# Patient Record
Sex: FEMALE | Race: WHITE | NOT HISPANIC OR LATINO | Employment: OTHER | URBAN - METROPOLITAN AREA
[De-identification: names, ages, dates, MRNs, and addresses within clinical notes are randomized per-mention and may not be internally consistent; named-entity substitution may affect disease eponyms.]

---

## 2018-06-25 DIAGNOSIS — I10 ESSENTIAL HYPERTENSION: Primary | ICD-10-CM

## 2018-06-25 RX ORDER — PROPRANOLOL HYDROCHLORIDE 20 MG/1
20 TABLET ORAL
Qty: 180 TABLET | Refills: 3 | Status: SHIPPED | OUTPATIENT
Start: 2018-06-25 | End: 2018-06-29 | Stop reason: SDUPTHER

## 2018-06-29 DIAGNOSIS — I10 ESSENTIAL HYPERTENSION: ICD-10-CM

## 2018-06-29 RX ORDER — PROPRANOLOL HYDROCHLORIDE 20 MG/1
20 TABLET ORAL
Qty: 180 TABLET | Refills: 0 | Status: SHIPPED | OUTPATIENT
Start: 2018-06-29 | End: 2018-12-18 | Stop reason: SDUPTHER

## 2018-08-23 ENCOUNTER — OFFICE VISIT (OUTPATIENT)
Dept: FAMILY MEDICINE CLINIC | Facility: CLINIC | Age: 63
End: 2018-08-23
Payer: MEDICARE

## 2018-08-23 VITALS
BODY MASS INDEX: 37.51 KG/M2 | OXYGEN SATURATION: 97 % | RESPIRATION RATE: 20 BRPM | TEMPERATURE: 96.4 F | WEIGHT: 233.4 LBS | DIASTOLIC BLOOD PRESSURE: 70 MMHG | HEIGHT: 66 IN | SYSTOLIC BLOOD PRESSURE: 130 MMHG | HEART RATE: 64 BPM

## 2018-08-23 DIAGNOSIS — R73.09 ABNORMAL BLOOD SUGAR: ICD-10-CM

## 2018-08-23 DIAGNOSIS — J20.9 ACUTE BRONCHITIS, UNSPECIFIED ORGANISM: Primary | ICD-10-CM

## 2018-08-23 DIAGNOSIS — R79.89 LOW VITAMIN D LEVEL: ICD-10-CM

## 2018-08-23 DIAGNOSIS — R93.89 ABNORMAL CT SCAN, CHEST: ICD-10-CM

## 2018-08-23 PROBLEM — I51.7 LEFT VENTRICULAR HYPERTROPHY: Status: ACTIVE | Noted: 2018-08-23

## 2018-08-23 PROBLEM — M85.80 OSTEOPENIA: Status: ACTIVE | Noted: 2018-08-23

## 2018-08-23 PROBLEM — R91.8 ABNORMAL CT SCAN, LUNG: Status: ACTIVE | Noted: 2017-10-18

## 2018-08-23 PROBLEM — I34.1 MITRAL VALVE PROLAPSE: Status: ACTIVE | Noted: 2018-08-23

## 2018-08-23 PROBLEM — E78.5 HYPERLIPIDEMIA: Status: ACTIVE | Noted: 2018-08-23

## 2018-08-23 PROBLEM — F41.9 ANXIETY: Status: ACTIVE | Noted: 2018-08-23

## 2018-08-23 PROCEDURE — 99214 OFFICE O/P EST MOD 30 MIN: CPT | Performed by: FAMILY MEDICINE

## 2018-08-23 RX ORDER — AMOXICILLIN AND CLAVULANATE POTASSIUM 500; 125 MG/1; MG/1
1 TABLET, FILM COATED ORAL EVERY 8 HOURS SCHEDULED
Qty: 30 TABLET | Refills: 0 | Status: SHIPPED | OUTPATIENT
Start: 2018-08-23 | End: 2018-09-02

## 2018-08-23 RX ORDER — ACETAMINOPHEN 500 MG
TABLET ORAL
COMMUNITY

## 2018-08-23 NOTE — PROGRESS NOTES
Assessment/Plan:      Diagnoses and all orders for this visit:    Acute bronchitis, unspecified organism  Comments:  call if not bettr or worse  Orders:  -     amoxicillin-clavulanate (AUGMENTIN) 500-125 mg per tablet; Take 1 tablet by mouth every 8 (eight) hours for 10 days    Smoking (tobacco) complicating pregnancy, second trimester  Comments:  advise to stop smoking   Abnormal blood sugar  Comments:  advise to lose wt     Abnormal CT scan, chest  -     CT chest wo contrast; Future    Low vitamin D level  -     Vitamin D 25 hydroxy; Future    Other orders  -     acetaminophen (TYLENOL) 500 mg tablet; 2 tablet po 3 times q day  -     Multiple Vitamins-Minerals (PRESERVISION AREDS 2 PO); Take 1 capsule by mouth daily          Subjective:     Patient ID: Daksha Desai is a 66 y o  female  Cough , x one week , productive , with yellow phlegm, moderate , off and one , getting worse , pt smokes   denied wheezing , chils , pt feels feverish   Chronic medical problems  Abnormal chest Ct scan , pt is not following with pulmonary , pt before she got sick , denied cough , pt denied hemoptysis , wt loss  Low vit D , pt denied fatigue , hair loss   Abnormal BS , pt denied polyuria or polydipsia     no test done since last office visii   patient had a slip from her psychiatrist to check CMP, CBC, TSH and lipid        Review of Systems   Constitutional: Negative for chills, diaphoresis and fatigue  HENT: Negative for ear pain, sore throat, trouble swallowing and voice change  Eyes: Negative for visual disturbance  Respiratory: Negative for chest tightness and shortness of breath  Cardiovascular: Negative for chest pain, palpitations and leg swelling  Gastrointestinal: Negative for abdominal pain, blood in stool, constipation, diarrhea and nausea  Endocrine: Negative for polydipsia and polyuria     Genitourinary: Negative for dysuria, flank pain, frequency, hematuria, pelvic pain, urgency, vaginal bleeding and vaginal discharge  Musculoskeletal: Negative for arthralgias, back pain, gait problem, myalgias and neck pain  Neurological: Negative for dizziness, tremors, seizures, weakness, light-headedness, numbness and headaches  Hematological: Negative for adenopathy  Does not bruise/bleed easily  Psychiatric/Behavioral: Negative for confusion  Objective:     Physical Exam   Constitutional: She is oriented to person, place, and time  She appears well-developed  No distress  HENT:   Head: Normocephalic and atraumatic  Right Ear: External ear normal    Left Ear: External ear normal    Nose: Nose normal    Mouth/Throat: Oropharynx is clear and moist  No oropharyngeal exudate  Eyes: Conjunctivae are normal  Pupils are equal, round, and reactive to light  No scleral icterus  Neck: Normal range of motion  Neck supple  No JVD present  No thyromegaly present  Cardiovascular: Normal rate, regular rhythm, normal heart sounds and intact distal pulses  No murmur heard  Pulmonary/Chest: Effort normal and breath sounds normal    Abdominal: Soft  Bowel sounds are normal  She exhibits no distension and no mass  There is no tenderness  There is no rebound and no guarding  Musculoskeletal: She exhibits no edema, tenderness or deformity  Lymphadenopathy:     She has no cervical adenopathy  Neurological: She is alert and oriented to person, place, and time  She has normal reflexes  No cranial nerve deficit  Coordination normal    Skin: No rash noted  She is not diaphoretic  No erythema  No pallor  Psychiatric: She has a normal mood and affect   Her behavior is normal  Judgment and thought content normal

## 2018-08-24 PROBLEM — R79.89 LOW VITAMIN D LEVEL: Status: ACTIVE | Noted: 2018-08-24

## 2018-09-01 ENCOUNTER — HOSPITAL ENCOUNTER (OUTPATIENT)
Dept: CT IMAGING | Facility: HOSPITAL | Age: 63
Discharge: HOME | End: 2018-09-01
Payer: MEDICARE

## 2018-09-01 DIAGNOSIS — R93.89 ABNORMAL CT SCAN, CHEST: ICD-10-CM

## 2018-09-01 PROCEDURE — 71250 CT THORAX DX C-: CPT

## 2018-10-01 ENCOUNTER — OFFICE VISIT (OUTPATIENT)
Dept: FAMILY MEDICINE CLINIC | Facility: CLINIC | Age: 63
End: 2018-10-01
Payer: MEDICARE

## 2018-10-01 VITALS
HEART RATE: 71 BPM | BODY MASS INDEX: 37.62 KG/M2 | OXYGEN SATURATION: 97 % | SYSTOLIC BLOOD PRESSURE: 130 MMHG | RESPIRATION RATE: 20 BRPM | TEMPERATURE: 97.4 F | DIASTOLIC BLOOD PRESSURE: 80 MMHG | WEIGHT: 232.4 LBS

## 2018-10-01 DIAGNOSIS — L98.9 SKIN LESION OF RIGHT ARM: ICD-10-CM

## 2018-10-01 DIAGNOSIS — R93.89 ABNORMAL CT SCAN, CHEST: Primary | ICD-10-CM

## 2018-10-01 DIAGNOSIS — K76.0 FATTY LIVER: ICD-10-CM

## 2018-10-01 DIAGNOSIS — N39.41 URGE INCONTINENCE OF URINE: ICD-10-CM

## 2018-10-01 DIAGNOSIS — R16.2 HEPATOSPLENOMEGALY: ICD-10-CM

## 2018-10-01 DIAGNOSIS — L98.9 SKIN LESION OF LEFT ARM: ICD-10-CM

## 2018-10-01 DIAGNOSIS — Z23 NEED FOR IMMUNIZATION AGAINST INFLUENZA: ICD-10-CM

## 2018-10-01 PROCEDURE — 99214 OFFICE O/P EST MOD 30 MIN: CPT | Performed by: FAMILY MEDICINE

## 2018-10-01 PROCEDURE — 90682 RIV4 VACC RECOMBINANT DNA IM: CPT

## 2018-10-01 PROCEDURE — 90471 IMMUNIZATION ADMIN: CPT

## 2018-10-01 NOTE — PROGRESS NOTES
Assessment/Plan:      Diagnoses and all orders for this visit:    Abnormal CT scan, chest  Comments:  abnormality resolved  advise to stop smoking  Hepatosplenomegaly  -     Alpha-1-antitrypsin; Future  -     CHENTE Screen w/ Reflex to Titer/Pattern; Future  -     Antimitochondrial antibody; Future  -     Anti-Smooth Muscle/Mitochond; Future  -     Ceruloplasmin; Future  -     Hepatitis A Ab, Total W/Refl IgM; Future  -     Hepatitis B surface antigen; Future  -     Hepatitis C antibody; Future  -     Iron Saturation %; Future  -     Iron, TIBC and Ferritin Panel; Future  -     Protime-INR; Future    Fatty liver  Comments:  advise to lose wt  Orders:  -     Hepatitis A Ab, Total W/Refl IgM; Future  -     Hepatitis B surface antigen; Future  -     Hepatitis C antibody; Future  -     Iron Saturation %; Future  -     Iron, TIBC and Ferritin Panel; Future  -     Protime-INR; Future    Urge incontinence of urine  -     Urine culture; Future  -     Urinalysis with reflex to microscopic    Skin lesion of left arm  -     Ambulatory referral to Plastic Surgery; Future    Skin lesion of right arm  -     Ambulatory referral to Plastic Surgery; Future    Need for immunization against influenza  -     influenza vaccine, [id number], quadrivalent, recombinant, PF, 0 5 mL, for patients 18 yr+ (FLUBLOK)          Subjective:     Patient ID: Georgiana Cobb is a 66 y o  female  Patient is here for follow-up  Abnormal CT scan of the chest   Denied chest pain, shortness of breath, cough or hemoptysis  Hepatomegaly  Denied abdominal pain  Fatty liver  Patient denied drinking alcohol, saw GI ,had liver biopsy in the past   New complaints  Skin lesion  Patient admit to have a skin lesion on left forearm and right forearm  for 3 months  It is getting larger  Home denied any drainage or change in color  Admitted to urgent continence for 1 year  denied urinary frequency, dysuria or hematuria  Depression   Doing well ,following with psychiatry   Has slip for blood work including CBC,CMP   TSH, LIPID    Test result  Chest CT scan discussed result with patient  Labs not done        Review of Systems   Constitutional: Negative for activity change, appetite change, chills, fatigue, fever and unexpected weight change  HENT: Negative for congestion, ear pain, sinus pressure and sore throat  Eyes: Negative for visual disturbance  Respiratory: Negative for cough, chest tightness, shortness of breath and wheezing  Cardiovascular: Negative for chest pain, palpitations and leg swelling  Gastrointestinal: Negative for abdominal pain, blood in stool, constipation, diarrhea, nausea and vomiting  Genitourinary: Negative for dysuria, frequency, hematuria and urgency  Musculoskeletal: Negative for arthralgias, back pain, gait problem, joint swelling, myalgias and neck pain  Skin: Negative for rash  Neurological: Negative for dizziness, tremors, seizures, syncope, weakness, light-headedness and headaches  Hematological: Negative for adenopathy  Does not bruise/bleed easily  Psychiatric/Behavioral: Negative for behavioral problems, confusion, dysphoric mood and sleep disturbance  Objective:     Physical Exam   Constitutional: She is oriented to person, place, and time  She appears well-developed and well-nourished  HENT:   Head: Normocephalic  Eyes: No scleral icterus  Neck: Neck supple  No JVD present  No thyromegaly present  Cardiovascular: Normal rate and regular rhythm  No murmur heard  Pulmonary/Chest: Effort normal and breath sounds normal    Abdominal: Soft  Bowel sounds are normal  She exhibits no distension and no mass  There is no tenderness  There is no rebound and no guarding  Musculoskeletal: She exhibits no edema or tenderness  Lymphadenopathy:     She has no cervical adenopathy  Neurological: She is alert and oriented to person, place, and time  No cranial nerve deficit   She exhibits normal muscle tone    Skin: No rash noted  No erythema  No pallor  5 x 4 mm pink inch elevated lesion located at the left forearm  9 x 6 mm lesion at the right forearm the base is fixed to the skin and the rest of the lesion is movable , it is slightly thick light brownish lesion   Psychiatric: She has a normal mood and affect   Her behavior is normal  Judgment and thought content normal

## 2018-10-31 ENCOUNTER — TRANSCRIBE ORDERS (OUTPATIENT)
Dept: ADMINISTRATIVE | Facility: HOSPITAL | Age: 63
End: 2018-10-31

## 2018-10-31 ENCOUNTER — APPOINTMENT (OUTPATIENT)
Dept: LAB | Facility: HOSPITAL | Age: 63
End: 2018-10-31
Payer: MEDICARE

## 2018-10-31 DIAGNOSIS — R16.2 HEPATOSPLENOMEGALY: ICD-10-CM

## 2018-10-31 DIAGNOSIS — K76.0 FATTY LIVER: ICD-10-CM

## 2018-10-31 DIAGNOSIS — N39.41 URGE INCONTINENCE OF URINE: ICD-10-CM

## 2018-10-31 DIAGNOSIS — F31.9 BIPOLAR 1 DISORDER (HCC): Primary | ICD-10-CM

## 2018-10-31 DIAGNOSIS — F31.9 BIPOLAR 1 DISORDER (HCC): ICD-10-CM

## 2018-10-31 DIAGNOSIS — R79.89 LOW VITAMIN D LEVEL: ICD-10-CM

## 2018-10-31 LAB
25(OH)D3 SERPL-MCNC: 11.4 NG/ML (ref 30–100)
ALBUMIN SERPL BCP-MCNC: 3.8 G/DL (ref 3.5–5)
ALP SERPL-CCNC: 86 U/L (ref 46–116)
ALT SERPL W P-5'-P-CCNC: 50 U/L (ref 12–78)
ANION GAP SERPL CALCULATED.3IONS-SCNC: 8 MMOL/L (ref 4–13)
AST SERPL W P-5'-P-CCNC: 39 U/L (ref 5–45)
BACTERIA UR QL AUTO: ABNORMAL /HPF
BASOPHILS # BLD AUTO: 0.02 THOUSANDS/ΜL (ref 0–0.1)
BASOPHILS NFR BLD AUTO: 0 % (ref 0–1)
BILIRUB SERPL-MCNC: 0.24 MG/DL (ref 0.2–1)
BILIRUB UR QL STRIP: NEGATIVE
BUN SERPL-MCNC: 7 MG/DL (ref 5–25)
CALCIUM SERPL-MCNC: 8.8 MG/DL (ref 8.3–10.1)
CHLORIDE SERPL-SCNC: 104 MMOL/L (ref 100–108)
CHOLEST SERPL-MCNC: 192 MG/DL (ref 50–200)
CLARITY UR: CLEAR
CO2 SERPL-SCNC: 29 MMOL/L (ref 21–32)
COLOR UR: ABNORMAL
CREAT SERPL-MCNC: 0.74 MG/DL (ref 0.6–1.3)
EOSINOPHIL # BLD AUTO: 0 THOUSAND/ΜL (ref 0–0.61)
EOSINOPHIL NFR BLD AUTO: 0 % (ref 0–6)
ERYTHROCYTE [DISTWIDTH] IN BLOOD BY AUTOMATED COUNT: 12.8 % (ref 11.6–15.1)
FERRITIN SERPL-MCNC: 92 NG/ML (ref 8–388)
GFR SERPL CREATININE-BSD FRML MDRD: 88 ML/MIN/1.73SQ M
GLUCOSE P FAST SERPL-MCNC: 104 MG/DL (ref 65–99)
GLUCOSE UR STRIP-MCNC: NEGATIVE MG/DL
HCT VFR BLD AUTO: 41.6 % (ref 34.8–46.1)
HDLC SERPL-MCNC: 29 MG/DL (ref 40–60)
HGB BLD-MCNC: 13.3 G/DL (ref 11.5–15.4)
HGB UR QL STRIP.AUTO: NEGATIVE
IMM GRANULOCYTES # BLD AUTO: 0.01 THOUSAND/UL (ref 0–0.2)
IMM GRANULOCYTES NFR BLD AUTO: 0 % (ref 0–2)
INR PPP: 1.07 (ref 0.86–1.17)
IRON SATN MFR SERPL: 17 %
IRON SERPL-MCNC: 53 UG/DL (ref 50–170)
KETONES UR STRIP-MCNC: NEGATIVE MG/DL
LDLC SERPL CALC-MCNC: 126 MG/DL (ref 0–100)
LEUKOCYTE ESTERASE UR QL STRIP: ABNORMAL
LYMPHOCYTES # BLD AUTO: 2.06 THOUSANDS/ΜL (ref 0.6–4.47)
LYMPHOCYTES NFR BLD AUTO: 40 % (ref 14–44)
MCH RBC QN AUTO: 28.5 PG (ref 26.8–34.3)
MCHC RBC AUTO-ENTMCNC: 32 G/DL (ref 31.4–37.4)
MCV RBC AUTO: 89 FL (ref 82–98)
MONOCYTES # BLD AUTO: 0.31 THOUSAND/ΜL (ref 0.17–1.22)
MONOCYTES NFR BLD AUTO: 6 % (ref 4–12)
NEUTROPHILS # BLD AUTO: 2.75 THOUSANDS/ΜL (ref 1.85–7.62)
NEUTS SEG NFR BLD AUTO: 54 % (ref 43–75)
NITRITE UR QL STRIP: NEGATIVE
NON-SQ EPI CELLS URNS QL MICRO: ABNORMAL /HPF
NONHDLC SERPL-MCNC: 163 MG/DL
NRBC BLD AUTO-RTO: 0 /100 WBCS
PH UR STRIP.AUTO: 5.5 [PH] (ref 4.5–8)
PLATELET # BLD AUTO: 188 THOUSANDS/UL (ref 149–390)
PMV BLD AUTO: 10 FL (ref 8.9–12.7)
POTASSIUM SERPL-SCNC: 3.9 MMOL/L (ref 3.5–5.3)
PROT SERPL-MCNC: 7.6 G/DL (ref 6.4–8.2)
PROT UR STRIP-MCNC: NEGATIVE MG/DL
PROTHROMBIN TIME: 14 SECONDS (ref 11.8–14.2)
RBC # BLD AUTO: 4.66 MILLION/UL (ref 3.81–5.12)
RBC #/AREA URNS AUTO: ABNORMAL /HPF
SODIUM SERPL-SCNC: 141 MMOL/L (ref 136–145)
SP GR UR STRIP.AUTO: 1.01 (ref 1–1.03)
TIBC SERPL-MCNC: 318 UG/DL (ref 250–450)
TRIGL SERPL-MCNC: 185 MG/DL
TSH SERPL DL<=0.05 MIU/L-ACNC: 2.09 UIU/ML (ref 0.36–3.74)
UROBILINOGEN UR QL STRIP.AUTO: 0.2 E.U./DL
WBC # BLD AUTO: 5.15 THOUSAND/UL (ref 4.31–10.16)
WBC #/AREA URNS AUTO: ABNORMAL /HPF

## 2018-10-31 PROCEDURE — 86708 HEPATITIS A ANTIBODY: CPT

## 2018-10-31 PROCEDURE — 87086 URINE CULTURE/COLONY COUNT: CPT

## 2018-10-31 PROCEDURE — 80053 COMPREHEN METABOLIC PANEL: CPT

## 2018-10-31 PROCEDURE — 84443 ASSAY THYROID STIM HORMONE: CPT

## 2018-10-31 PROCEDURE — 82728 ASSAY OF FERRITIN: CPT

## 2018-10-31 PROCEDURE — 80061 LIPID PANEL: CPT

## 2018-10-31 PROCEDURE — 86038 ANTINUCLEAR ANTIBODIES: CPT

## 2018-10-31 PROCEDURE — 86256 FLUORESCENT ANTIBODY TITER: CPT

## 2018-10-31 PROCEDURE — 85025 COMPLETE CBC W/AUTO DIFF WBC: CPT

## 2018-10-31 PROCEDURE — 85610 PROTHROMBIN TIME: CPT

## 2018-10-31 PROCEDURE — 36415 COLL VENOUS BLD VENIPUNCTURE: CPT

## 2018-10-31 PROCEDURE — 83540 ASSAY OF IRON: CPT

## 2018-10-31 PROCEDURE — 83550 IRON BINDING TEST: CPT

## 2018-10-31 PROCEDURE — 86803 HEPATITIS C AB TEST: CPT

## 2018-10-31 PROCEDURE — 81001 URINALYSIS AUTO W/SCOPE: CPT | Performed by: FAMILY MEDICINE

## 2018-10-31 PROCEDURE — 86235 NUCLEAR ANTIGEN ANTIBODY: CPT

## 2018-10-31 PROCEDURE — 82103 ALPHA-1-ANTITRYPSIN TOTAL: CPT

## 2018-10-31 PROCEDURE — 87147 CULTURE TYPE IMMUNOLOGIC: CPT

## 2018-10-31 PROCEDURE — 87340 HEPATITIS B SURFACE AG IA: CPT

## 2018-10-31 PROCEDURE — 82306 VITAMIN D 25 HYDROXY: CPT

## 2018-10-31 PROCEDURE — 82390 ASSAY OF CERULOPLASMIN: CPT

## 2018-11-01 LAB
A1AT SERPL-MCNC: 136 MG/DL (ref 90–200)
ACTIN IGG SERPL-ACNC: 4 UNITS (ref 0–19)
CERULOPLASMIN SERPL-MCNC: 27.8 MG/DL (ref 19–39)
HAV AB SER QL IA: NORMAL
HBV SURFACE AG SER QL: NORMAL
HCV AB SER QL: NORMAL
MITOCHONDRIA M2 IGG SER-ACNC: 2 UNITS (ref 0–20)

## 2018-11-02 LAB
BACTERIA UR CULT: ABNORMAL
BACTERIA UR CULT: ABNORMAL

## 2018-11-03 LAB — RYE IGE QN: NEGATIVE

## 2018-11-05 DIAGNOSIS — E55.9 VITAMIN D DEFICIENCY: Primary | ICD-10-CM

## 2018-11-05 DIAGNOSIS — N39.0 URINARY TRACT INFECTION WITHOUT HEMATURIA, SITE UNSPECIFIED: ICD-10-CM

## 2018-11-05 RX ORDER — AMOXICILLIN 500 MG/1
500 CAPSULE ORAL EVERY 8 HOURS SCHEDULED
Qty: 21 CAPSULE | Refills: 0 | Status: SHIPPED | OUTPATIENT
Start: 2018-11-05 | End: 2018-11-12

## 2018-11-05 RX ORDER — ERGOCALCIFEROL 1.25 MG/1
50000 CAPSULE ORAL WEEKLY
Qty: 12 CAPSULE | Refills: 0 | Status: SHIPPED | OUTPATIENT
Start: 2018-11-05 | End: 2019-05-29 | Stop reason: SDUPTHER

## 2018-12-10 PROCEDURE — 88305 TISSUE EXAM BY PATHOLOGIST: CPT | Performed by: PATHOLOGY

## 2018-12-11 ENCOUNTER — LAB REQUISITION (OUTPATIENT)
Dept: LAB | Facility: HOSPITAL | Age: 63
End: 2018-12-11
Payer: MEDICARE

## 2018-12-11 DIAGNOSIS — D23.71 OTHER BENIGN NEOPLASM OF SKIN OF RIGHT LOWER LIMB, INCLUDING HIP: ICD-10-CM

## 2018-12-11 DIAGNOSIS — D23.61 OTHER BENIGN NEOPLASM OF SKIN OF RIGHT UPPER LIMB, INCLUDING SHOULDER: ICD-10-CM

## 2018-12-17 RX ORDER — OFLOXACIN 3 MG/ML
SOLUTION AURICULAR (OTIC)
Refills: 1 | COMMUNITY
Start: 2018-10-11 | End: 2018-12-18 | Stop reason: ALTCHOICE

## 2018-12-18 ENCOUNTER — OFFICE VISIT (OUTPATIENT)
Dept: CARDIOLOGY CLINIC | Facility: CLINIC | Age: 63
End: 2018-12-18
Payer: MEDICARE

## 2018-12-18 VITALS
BODY MASS INDEX: 38.95 KG/M2 | WEIGHT: 233.8 LBS | SYSTOLIC BLOOD PRESSURE: 122 MMHG | OXYGEN SATURATION: 97 % | HEART RATE: 61 BPM | DIASTOLIC BLOOD PRESSURE: 60 MMHG | HEIGHT: 65 IN

## 2018-12-18 DIAGNOSIS — I10 HYPERTENSION, UNSPECIFIED TYPE: Primary | ICD-10-CM

## 2018-12-18 DIAGNOSIS — I34.1 MITRAL VALVE PROLAPSE: ICD-10-CM

## 2018-12-18 DIAGNOSIS — I10 ESSENTIAL HYPERTENSION: ICD-10-CM

## 2018-12-18 PROCEDURE — 99214 OFFICE O/P EST MOD 30 MIN: CPT | Performed by: INTERNAL MEDICINE

## 2018-12-18 PROCEDURE — 93000 ELECTROCARDIOGRAM COMPLETE: CPT | Performed by: INTERNAL MEDICINE

## 2018-12-18 RX ORDER — PROPRANOLOL HYDROCHLORIDE 20 MG/1
20 TABLET ORAL
Qty: 180 TABLET | Refills: 3 | Status: SHIPPED | OUTPATIENT
Start: 2018-12-18 | End: 2018-12-22 | Stop reason: SDUPTHER

## 2018-12-18 NOTE — ASSESSMENT & PLAN NOTE
History of mitral valve prolapse and left ventricular hypertrophy in the past   From cardiac perspective symptoms have been stable  She has had no significant palpitations or other concerning symptoms  Am glad that she has quit smoking  And she feels better  - Patient is advised to continue current medical therapy  It is my opinion that increasing propranolol does as she wants is not a good idea as it may make her bradycardic and give her more slowing when she is going through  Emotional stress  -  We will arrange for an echocardiogram to reassess her left ventricular hypertrophy and mitral valve prolapse  - Dietary and medical compliance are reinforced  - Advised  to report any concerning symptoms such as chest pain, shortness of breath, decline in exercise tolerance or presyncope/syncope

## 2018-12-18 NOTE — PROGRESS NOTES
[facility]    [address], [address]   [address]  [address]  Phone#  [phone]  Fax#  [phone]  *-*-*-*-*-*-*-*-*-*-*-*-*-*-*-*-*-*-*-*-*-*-*-*-*-*-*-*-*-*-*-*-*-*-*-*-*-*-*-*-*-*-*-*-*-*-*-*-*-*-*-*-*-*    Layla Oh DATE: 12/18/18 3:16 PM    PATIENT NAME: Maria G Trejo   1955    [id number]  Age: 66 y o  Sex: female    AUTHOR: Nathalia Delcid MD  PRIMARYCARE PHYSICIAN: Levar Hagan MD    DIAGNOSES:  1  History of mitral valve prolapse by echocardiogram in the past  2  Benign essential hypertension  3  Hypertensive heart disease  4  History of depression and mood disorder  5  Osteopenia  6  History of tibial and fibular fracture in 2004  7  History of hysterectomy in the past  8  Degenerative joint disease status post fusion surgery of the lumbar spine  9  Irritable bowel syndrome  10  History of splenomegaly  11  Asymptomatic bradycardia  12  Mixed dyslipidemia     Holter monitor in August 2016 showed predominant sinus rhythm with average heart rate of 76 beats per minute with no significant arrhythmia  Patient had single episode of palpitation during the monitoring, rhythm was normal sinus  No recent echocardiogram    CURRENT ECG:  Results for orders placed or performed in visit on 12/18/18   POCT ECG    Narrative    Sinus rhythm at 61 beats per minute, normal axis and intervals, no significant ST T wave abnormalities  CARDIOLOGY ASSESSMENT & PLAN:  No problem-specific Assessment & Plan notes found for this encounter  INTERVAL HISTORY & HISTORY OF PRESENT ILLNESS:  Maria G Trejo is here for follow-up regarding her cardiac comorbidities which include:   History of mitral valve prolapse in the past, palpitation and sinus arrhythmia  She is here for follow-up  She has been dealing with significant mental and emotional stress due to both her parents passing away last year and in dealing with family and child who has ADHD    She follows with psychiatrist Dr Jennifer Triana but does not see a counselor on a regular basis  She is wondering if we should increase the dose of propranolol because of her increasing anxiety in the recent past       She has recently quit smoking with the aid of nicotine patch which is now down to 7 mg patch  This has affected her sleep and she has significant insomnia  Otherwise she denies any subjective cardiac complaints  There have been no recent active symptoms of chest discomfort or exertional angina  There is no dyspnea with usual activities or exertion  No orthopnea, PND or pedal edema  No palpitations, lightheadedness, presyncope, or syncope  Denies any recent hospitalizations or other illnesses  Reports being compliant with medications  REVIEW OF SYMPTOMS:    Positive for:    Mood disturbance, some depression and some fatigue  Insomnia  Negative for: All remaining as reviewed below and in HPI  SYSTEM SYMPTOMS REVIEWED:  General--weight change, fever, night sweats  Respirato      Cardiovascular--chest pain, syncope, dyspnea on exertion, edema, decline in exercise tolerance, claudication   Gastrointestinal--persistent vomiting, diarrhea, abdominal distention, blood in stool   Muscular or skeletal--joint pain or swelling   Neurologic--headaches, syncope, abnormal movement  Hematologic--history of easy bruising and bleeding   Endocrine--thyroid enlargement, heat or cold intolerance, polyuria   Psychiatric--anxiety, depression     *-*-*-*-*-*-*-*-*-*-*-*-*-*-*-*-*-*-*-*-*-*-*-*-*-*-*-*-*-*-*-*-*-*-*-*-*-*-*-*-*-*-*-*-*-*-*-*-*-*-*-*-*-*-  VITAL SIGNS:  Vitals:    12/18/18 1414   BP: 122/60   BP Location: Left arm   Patient Position: Sitting   Cuff Size: Large   Pulse: 61   SpO2: 97%   Weight: 106 kg (233 lb 12 8 oz)   Height: 5' 5" (1 651 m)       *-*-*-*-*-*-*-*-*-*-*-*-*-*-*-*-*-*-*-*-*-*-*-*-*-*-*-*-*-*-*-*-*-*-*-*-*-*-*-*-*-*-*-*-*-*-*-*-*-*-*-*-*-*-  PHYSICAL EXAM:  General Appearance:    Alert, cooperative, no distress, appears stated age, large built, obese   Head, Eyes, ENT:    No obvious abnormality, moist mucous mebranes  Neck:   Supple, no carotid bruit or JVD   Back:     Symmetric, no curvature  Lungs:     Respirations unlabored  Clear to auscultation bilaterally,    Chest wall:    No tenderness or deformity   Heart:     Slightly bradycardic, regular rate and rhythm, S1 and S2 normal, no murmur, rub  or gallop  Abdomen:     Soft, non-tender, No obvious masses, or organomegaly   Extremities:   Extremities normal, no cyanosis or edema    Skin:   Skin color, texture, turgor normal, no rashes or lesions     *-*-*-*-*-*-*-*-*-*-*-*-*-*-*-*-*-*-*-*-*-*-*-*-*-*-*-*-*-*-*-*-*-*-*-*-*-*-*-*-*-*-*-*-*-*-*-*-*-*-*-*-*-*-  CURRENT MEDICATION LIST:    Current Outpatient Prescriptions:     acetaminophen (TYLENOL) 500 mg tablet, 2 tablet po 3 times q day, Disp: , Rfl:     clonazePAM (KlonoPIN) 0 5 MG disintegrating tablet, Take 0 5 mg by mouth 2 (two) times a day, Disp: , Rfl:     desvenlafaxine (PRISTIQ) 100 mg 24 hr tablet, Take 100 mg by mouth daily  , Disp: , Rfl:     ergocalciferol (VITAMIN D2) 50,000 units, Take 1 capsule (50,000 Units total) by mouth once a week, Disp: 12 capsule, Rfl: 0    lurasidone (LATUDA) 20 mg tablet, Take 20 mg by mouth daily  , Disp: , Rfl:     Multiple Vitamins-Minerals (PRESERVISION AREDS 2 PO), Take 1 capsule by mouth daily, Disp: , Rfl:     Omega-3 Fatty Acids (FISH OIL PO), Take 1 capsule by mouth daily  , Disp: , Rfl:     OXcarbazepine (TRILEPTAL) 300 mg tablet, Take 300 mg by mouth 2 (two) times a day  , Disp: , Rfl:     propranolol (INDERAL) 20 mg tablet, Take 1 tablet (20 mg total) by mouth 2 (two) times a day for 90 days, Disp: 180 tablet, Rfl: 0    ALLERGIES:  Allergies   Allergen Reactions    Bupropion     Corticosteroids     Erythromycin     Escitalopram     Tetracycline      Other reaction(s):  Other (See Comments)  Mouth sores  Other Palpitations     "Steroids"        *-*-*-*-*-*-*-*-*-*-*-*-*-*-*-*-*-*-*-*-*-*-*-*-*-*-*-*-*-*-*-*-*-*-*-*-*-*-*-*-*-*-*-*-*-*-*-*-*-*-*-*-*-*-    LABORATORY DATA:  I have personally reviewed pertinent labs  Potassium   Date Value Ref Range Status   10/31/2018 3 9 3 5 - 5 3 mmol/L Final     Chloride   Date Value Ref Range Status   10/31/2018 104 100 - 108 mmol/L Final     CO2   Date Value Ref Range Status   10/31/2018 29 21 - 32 mmol/L Final     BUN   Date Value Ref Range Status   10/31/2018 7 5 - 25 mg/dL Final     Creatinine   Date Value Ref Range Status   10/31/2018 0 74 0 60 - 1 30 mg/dL Final     Comment:     Standardized to IDMS reference method     eGFR   Date Value Ref Range Status   10/31/2018 88 ml/min/1 73sq m Final     Calcium   Date Value Ref Range Status   10/31/2018 8 8 8 3 - 10 1 mg/dL Final     AST   Date Value Ref Range Status   10/31/2018 39 5 - 45 U/L Final     Comment:       Specimen collection should occur prior to Sulfasalazine administration due to the potential for falsely depressed results  ALT   Date Value Ref Range Status   10/31/2018 50 12 - 78 U/L Final     Comment:       Specimen collection should occur prior to Sulfasalazine administration due to the potential for falsely depressed results        Alkaline Phosphatase   Date Value Ref Range Status   10/31/2018 86 46 - 116 U/L Final     WBC   Date Value Ref Range Status   10/31/2018 5 15 4 31 - 10 16 Thousand/uL Final     Hemoglobin   Date Value Ref Range Status   10/31/2018 13 3 11 5 - 15 4 g/dL Final     Platelets   Date Value Ref Range Status   10/31/2018 188 149 - 390 Thousands/uL Final     INR   Date Value Ref Range Status   10/31/2018 1 07 0 86 - 1 17 Final     No results found for: CKMB, BNP, DIGOXIN  No results found for: TSH  HDL, Direct   Date Value Ref Range Status   10/31/2018 29 (L) 40 - 60 mg/dL Final     Comment:       HDL Cholesterol:       High    >60 mg/dL       Low     <41 mg/dL  Specimen collection should occur prior to Metamizole administration due to the potential for falsley depressed results  Triglycerides   Date Value Ref Range Status   10/31/2018 185 (H) <=150 mg/dL Final     Comment:       Triglyceride:     Normal          <150 mg/dl     Borderline High 150-199 mg/dl     High            200-499 mg/dl        Very High       >499 mg/dl    Specimen collection should occur prior to N-Acetylcysteine or Metamizole administration due to the potential for falsely depressed results  No results found for: HGBA1C  Urine Culture   Date Value Ref Range Status   10/31/2018 (A)  Final    >100,000 cfu/ml Beta Hemolytic Streptococcus Group B     Comment: This organism is intrinisically susceptible to Penicillin  If sensitivites to other antibiotics are required, please call the Microbiology Department at [phone] within 5 days  10/31/2018 20,000-29,000 cfu/ml   Final     Comment:     Mixed Contaminants X3       *-*-*-*-*-*-*-*-*-*-*-*-*-*-*-*-*-*-*-*-*-*-*-*-*-*-*-*-*-*-*-*-*-*-*-*-*-*-*-*-*-*-*-*-*-*-*-*-*-*-*-*-*-*-  PREVIOUS CARDIOLOGY & RADIOLOGY RESULTS:  No results found for this or any previous visit  No results found for this or any previous visit  No results found for this or any previous visit  No results found for this or any previous visit  Ct Chest Wo Contrast    Result Date: 9/5/2018  Impression: No abnormality identified in the lungs  Hepatomegaly with steatosis  Borderline splenomegaly   Workstation performed: [id number]        *-*-*-*-*-*-*-*-*-*-*-*-*-*-*-*-*-*-*-*-*-*-*-*-*-*-*-*-*-*-*-*-*-*-*-*-*-*-*-*-*-*-*-*-*-*-*-*-*-*-*-*-*-*-  SIGNATURES:   @CDL@   Sebastián Cason MD     CC:   MD Augustine Lao MD   *-*-*-*-*-*-*-*-*-*-*-*-*-*-*-*-*-*-*-*-*-*-*-*-*-*-*-*-*-*-*-*-*-*-*-*-*-*-*-*-*-*-*-*-*-*-*-*-*-*-*-*-*-*-    Social History     Social History    Marital status: /Civil Union     Spouse name: N/A    Number of children: N/A    Years of education: N/A Occupational History    Not on file       Social History Main Topics    Smoking status: Heavy Tobacco Smoker     Packs/day: 1 00     Types: Cigarettes     Last attempt to quit: 11/1/2014    Smokeless tobacco: Current User      Comment: pt stated she plans to quit again 10/13/18    Alcohol use Yes      Comment: occ drinker    Drug use: No    Sexual activity: Not Currently     Other Topics Concern    Not on file     Social History Narrative    No narrative on file      Family History   Problem Relation Age of Onset    Hypertension Mother     Anxiety disorder Mother     Depression Mother     Depression Father     Hypertension Father     Peripheral vascular disease Father     Anxiety disorder Father      Past Surgical History:   Procedure Laterality Date    TIBIA FRACTURE SURGERY Left     TOTAL ABDOMINAL HYSTERECTOMY W/ BILATERAL SALPINGOOPHORECTOMY

## 2018-12-20 PROCEDURE — 88305 TISSUE EXAM BY PATHOLOGIST: CPT | Performed by: PATHOLOGY

## 2018-12-21 ENCOUNTER — LAB REQUISITION (OUTPATIENT)
Dept: LAB | Facility: HOSPITAL | Age: 63
End: 2018-12-21
Payer: MEDICARE

## 2018-12-21 DIAGNOSIS — D23.39 OTHER BENIGN NEOPLASM OF SKIN OF OTHER PARTS OF FACE: ICD-10-CM

## 2018-12-21 DIAGNOSIS — I10 ESSENTIAL HYPERTENSION: Primary | ICD-10-CM

## 2018-12-22 RX ORDER — PROPRANOLOL HYDROCHLORIDE 20 MG/1
20 TABLET ORAL EVERY 12 HOURS SCHEDULED
Qty: 180 TABLET | Refills: 0 | Status: SHIPPED | OUTPATIENT
Start: 2018-12-22 | End: 2019-05-08 | Stop reason: SDUPTHER

## 2019-02-15 ENCOUNTER — HOSPITAL ENCOUNTER (OUTPATIENT)
Dept: NON INVASIVE DIAGNOSTICS | Facility: HOSPITAL | Age: 64
Discharge: HOME | End: 2019-02-15
Attending: INTERNAL MEDICINE
Payer: MEDICARE

## 2019-02-15 DIAGNOSIS — I10 HYPERTENSION, UNSPECIFIED TYPE: ICD-10-CM

## 2019-02-15 DIAGNOSIS — I34.1 MITRAL VALVE PROLAPSE: ICD-10-CM

## 2019-02-15 PROCEDURE — 93306 TTE W/DOPPLER COMPLETE: CPT

## 2019-02-18 PROCEDURE — 93306 TTE W/DOPPLER COMPLETE: CPT | Performed by: INTERNAL MEDICINE

## 2019-03-27 ENCOUNTER — OFFICE VISIT (OUTPATIENT)
Dept: INTERNAL MEDICINE CLINIC | Facility: CLINIC | Age: 64
End: 2019-03-27
Payer: MEDICARE

## 2019-03-27 VITALS
WEIGHT: 238.8 LBS | HEIGHT: 66 IN | SYSTOLIC BLOOD PRESSURE: 158 MMHG | TEMPERATURE: 97.9 F | DIASTOLIC BLOOD PRESSURE: 90 MMHG | HEART RATE: 77 BPM | BODY MASS INDEX: 38.38 KG/M2 | RESPIRATION RATE: 16 BRPM

## 2019-03-27 DIAGNOSIS — E78.5 HYPERLIPIDEMIA, UNSPECIFIED HYPERLIPIDEMIA TYPE: ICD-10-CM

## 2019-03-27 DIAGNOSIS — R79.89 LOW VITAMIN D LEVEL: ICD-10-CM

## 2019-03-27 DIAGNOSIS — Z12.39 SCREENING FOR MALIGNANT NEOPLASM OF BREAST: ICD-10-CM

## 2019-03-27 DIAGNOSIS — F41.9 ANXIETY: ICD-10-CM

## 2019-03-27 DIAGNOSIS — R73.09 ABNORMAL BLOOD SUGAR: ICD-10-CM

## 2019-03-27 DIAGNOSIS — I51.7 LEFT VENTRICULAR HYPERTROPHY: Primary | ICD-10-CM

## 2019-03-27 DIAGNOSIS — K75.81 STEATOHEPATITIS: ICD-10-CM

## 2019-03-27 DIAGNOSIS — F31.81 BIPOLAR 2 DISORDER (HCC): ICD-10-CM

## 2019-03-27 DIAGNOSIS — K21.9 GASTROESOPHAGEAL REFLUX DISEASE, ESOPHAGITIS PRESENCE NOT SPECIFIED: ICD-10-CM

## 2019-03-27 PROBLEM — F31.9 BIPOLAR 1 DISORDER (HCC): Status: ACTIVE | Noted: 2019-03-27

## 2019-03-27 PROCEDURE — 99214 OFFICE O/P EST MOD 30 MIN: CPT | Performed by: INTERNAL MEDICINE

## 2019-03-27 RX ORDER — OMEPRAZOLE 40 MG/1
40 CAPSULE, DELAYED RELEASE ORAL DAILY
Qty: 30 CAPSULE | Refills: 1 | Status: SHIPPED | OUTPATIENT
Start: 2019-03-27 | End: 2019-04-22 | Stop reason: SDUPTHER

## 2019-03-27 NOTE — PROGRESS NOTES
Assessment/Plan:    Gastroesophageal reflux disease  Start on daily omeprazole, dietary restrictions  If symptoms do not improve will get her to see gastroenterology again for an endoscopy  Left ventricular hypertrophy  Blood pressure is somewhat elevated but has been well controlled in the past   Patient does look quite anxious and uncomfortable due to acid reflux  An EKG was done in the office today which showed sinus rhythm with no acute ST T wave changes  Will recheck at next office visit    Hyperlipidemia  Monitor lipid panel and start on statin if needed  Already takes fish oil    Bipolar 1 disorder ([address] )  Continue follow-up with Psychiatry  Low vitamin D level  Recheck vitamin-D level and supplement as needed       Diagnoses and all orders for this visit:    Left ventricular hypertrophy    Bipolar 2 disorder (HCC)  -     CBC and differential  -     TSH, 3rd generation with Free T4 reflex    Anxiety  -     CBC and differential  -     TSH, 3rd generation with Free T4 reflex    Hyperlipidemia, unspecified hyperlipidemia type  -     Hemoglobin A1C  -     TSH, 3rd generation with Free T4 reflex  -     Lipid Panel with Direct LDL reflex    Gastroesophageal reflux disease, esophagitis presence not specified  -     omeprazole (PriLOSEC) 40 MG capsule; Take 1 capsule (40 mg total) by mouth daily  -     CBC and differential    Abnormal blood sugar  -     Hemoglobin A1C  -     Lipid Panel with Direct LDL reflex    Low vitamin D level  -     Vitamin D 25 hydroxy    Screening for malignant neoplasm of breast  -     Mammo screening bilateral w cad; Future    Steatohepatitis  -     Comprehensive metabolic panel  -     Gamma GT    Other orders  -     lurasidone (LATUDA) 40 mg tablet; Take 40 mg by mouth daily with breakfast          Subjective:   Chief Complaint   Patient presents with   [address]    Heartburn        Patient ID: Ana Maria Salazar is a 60 y o  female      She comes in to establish care as a new patient  She notes that for the last couple of days she has been having terrible heartburn and acid reflux  She has been taking Tums with slight improvement in symptoms but the symptoms return  He walks daily with her dog with no chest pain or shortness of breath on exertion  She saw the cardiologist recently and underwent an echocardiogram   She has bipolar and her symptoms have been somewhat worse recently due to acute psychiatric illness for her son and stress about her 's health  She has been following up with her psychiatrist regularly  She saw a gastroenterologist once for her liver abnormalities and even had a liver biopsy and she was told that it was only fat deposition  The following portions of the patient's history were reviewed and updated as appropriate: current medications, past medical history, past social history and past surgical history  PHQ-9 Depression Screening    PHQ-9:    Frequency of the following problems over the past two weeks:       Little interest or pleasure in doing things:  3 - nearly every day  Feeling down, depressed, or hopeless:  3 - nearly every day  Trouble falling or staying asleep, or sleeping too much:  1 - several days  Feeling tired or having little energy:  3 - nearly every day  Poor appetite or overeatin - several days  Feeling bad about yourself - or that you are a failure or have let yourself or your family down:  2 - more than half the days  Trouble concentrating on things, such as reading the newspaper or watching television:  0 - not at all  Moving or speaking so slowly that other people could have noticed   Or the opposite - being so fidgety or restless that you have been moving around a lot more than usual:  1 - several days  Thoughts that you would be better off dead, or of hurting yourself in some way:  0 - not at all  PHQ-2 Score:  6  PHQ-9 Score:  14           Current Outpatient Medications:     acetaminophen (TYLENOL) 500 mg tablet, 2 tablet po 3 times q day, Disp: , Rfl:     clonazePAM (KlonoPIN) 0 5 MG disintegrating tablet, Take 0 5 mg by mouth 2 (two) times a day, Disp: , Rfl:     desvenlafaxine (PRISTIQ) 100 mg 24 hr tablet, Take 100 mg by mouth daily  , Disp: , Rfl:     ergocalciferol (VITAMIN D2) 50,000 units, Take 1 capsule (50,000 Units total) by mouth once a week, Disp: 12 capsule, Rfl: 0    lurasidone (LATUDA) 40 mg tablet, Take 40 mg by mouth daily with breakfast, Disp: , Rfl:     Multiple Vitamins-Minerals (PRESERVISION AREDS 2 PO), Take 1 capsule by mouth daily, Disp: , Rfl:     Omega-3 Fatty Acids (FISH OIL PO), Take 1 capsule by mouth daily  , Disp: , Rfl:     OXcarbazepine (TRILEPTAL) 300 mg tablet, Take 300 mg by mouth 2 (two) times a day  , Disp: , Rfl:     propranolol (INDERAL) 20 mg tablet, Take 1 tablet (20 mg total) by mouth every 12 (twelve) hours for 90 days, Disp: 180 tablet, Rfl: 0    omeprazole (PriLOSEC) 40 MG capsule, Take 1 capsule (40 mg total) by mouth daily, Disp: 30 capsule, Rfl: 1    Review of Systems   Constitutional: Negative for fatigue, fever and unexpected weight change  HENT: Negative for ear pain, hearing loss and sore throat  Eyes: Negative for pain and discharge  Respiratory: Negative for cough, chest tightness and shortness of breath  Cardiovascular: Negative for chest pain and palpitations  Gastrointestinal: Positive for abdominal pain  Negative for blood in stool, constipation, diarrhea and nausea  Genitourinary: Negative for dysuria, frequency and hematuria  Musculoskeletal: Negative for arthralgias and joint swelling  Skin: Negative for rash  Allergic/Immunologic: Negative for immunocompromised state  Neurological: Negative for dizziness and headaches  Hematological: Negative for adenopathy  Psychiatric/Behavioral: Negative for confusion and sleep disturbance           Objective:  /90 (BP Location: Left arm, Patient Position: Sitting, Cuff Size: Standard)   Pulse 77   Temp 97 9 °F (36 6 °C)   Resp 16   Ht 5' 6" (1 676 m)   Wt 108 kg (238 lb 12 8 oz)   BMI 38 54 kg/m²      Physical Exam   Constitutional: She appears well-developed and well-nourished  She appears distressed  HENT:   Head: Normocephalic and atraumatic  Right Ear: Tympanic membrane normal    Left Ear: Tympanic membrane normal    Nose: Nose normal    Mouth/Throat: Oropharynx is clear and moist  No posterior oropharyngeal edema or posterior oropharyngeal erythema  Eyes: Pupils are equal, round, and reactive to light  Conjunctivae are normal  Right eye exhibits no discharge  Neck: Normal range of motion  Neck supple  No thyromegaly present  Cardiovascular: Normal rate, regular rhythm, S1 normal, S2 normal and normal heart sounds  PMI is not displaced  No murmur heard  Pulmonary/Chest: Effort normal and breath sounds normal  No accessory muscle usage  No apnea  No respiratory distress  She has no rhonchi  She has no rales  Abdominal: Soft  Normal appearance and bowel sounds are normal  She exhibits no shifting dullness  There is no hepatosplenomegaly  There is no tenderness  There is no rebound and no CVA tenderness  Musculoskeletal: Normal range of motion  She exhibits no edema or tenderness  Lymphadenopathy:     She has no cervical adenopathy  Neurological: She is alert  Skin: Skin is warm and intact  No rash noted  Psychiatric: Her speech is normal  Her mood appears anxious  Nursing note and vitals reviewed  No results found for this or any previous visit (from the past 1008 hour(s))  ]    No results found  BMI Counseling: Body mass index is 38 54 kg/m²  Discussed the patient's BMI with her  The BMI is above average  BMI counseling and education was provided to the patient  Nutrition recommendations include 3-5 servings of fruits/vegetables daily and moderation in carbohydrate intake

## 2019-03-28 PROBLEM — K75.81 STEATOHEPATITIS: Status: ACTIVE | Noted: 2019-03-28

## 2019-03-28 NOTE — ASSESSMENT & PLAN NOTE
Blood pressure is somewhat elevated but has been well controlled in the past   Patient does look quite anxious and uncomfortable due to acid reflux  An EKG was done in the office today which showed sinus rhythm with no acute ST T wave changes    Will recheck at next office visit

## 2019-03-28 NOTE — ASSESSMENT & PLAN NOTE
Start on daily omeprazole, dietary restrictions  If symptoms do not improve will get her to see gastroenterology again for an endoscopy

## 2019-04-10 ENCOUNTER — HOSPITAL ENCOUNTER (OUTPATIENT)
Dept: MAMMOGRAPHY | Facility: CLINIC | Age: 64
Discharge: HOME | End: 2019-04-10
Payer: MEDICARE

## 2019-04-10 VITALS — HEIGHT: 66 IN | BODY MASS INDEX: 38.25 KG/M2 | WEIGHT: 238 LBS

## 2019-04-10 DIAGNOSIS — Z12.39 SCREENING FOR MALIGNANT NEOPLASM OF BREAST: ICD-10-CM

## 2019-04-10 PROCEDURE — 77067 SCR MAMMO BI INCL CAD: CPT

## 2019-04-22 DIAGNOSIS — K21.9 GASTROESOPHAGEAL REFLUX DISEASE, ESOPHAGITIS PRESENCE NOT SPECIFIED: ICD-10-CM

## 2019-04-22 RX ORDER — OMEPRAZOLE 40 MG/1
40 CAPSULE, DELAYED RELEASE ORAL DAILY
Qty: 30 CAPSULE | Refills: 1 | Status: SHIPPED | OUTPATIENT
Start: 2019-04-22 | End: 2019-05-29 | Stop reason: SDUPTHER

## 2019-05-08 DIAGNOSIS — I10 ESSENTIAL HYPERTENSION: ICD-10-CM

## 2019-05-09 RX ORDER — PROPRANOLOL HYDROCHLORIDE 20 MG/1
20 TABLET ORAL EVERY 12 HOURS SCHEDULED
Qty: 180 TABLET | Refills: 0 | Status: SHIPPED | OUTPATIENT
Start: 2019-05-09 | End: 2019-10-14 | Stop reason: SDUPTHER

## 2019-05-21 ENCOUNTER — APPOINTMENT (OUTPATIENT)
Dept: LAB | Facility: HOSPITAL | Age: 64
End: 2019-05-21
Payer: MEDICARE

## 2019-05-21 LAB
25(OH)D3 SERPL-MCNC: 14.9 NG/ML (ref 30–100)
ALBUMIN SERPL BCP-MCNC: 3.6 G/DL (ref 3.5–5)
ALP SERPL-CCNC: 73 U/L (ref 46–116)
ALT SERPL W P-5'-P-CCNC: 70 U/L (ref 12–78)
ANION GAP SERPL CALCULATED.3IONS-SCNC: 9 MMOL/L (ref 4–13)
AST SERPL W P-5'-P-CCNC: 75 U/L (ref 5–45)
BASOPHILS # BLD AUTO: 0.02 THOUSANDS/ΜL (ref 0–0.1)
BASOPHILS NFR BLD AUTO: 0 % (ref 0–1)
BILIRUB SERPL-MCNC: 0.53 MG/DL (ref 0.2–1)
BUN SERPL-MCNC: 8 MG/DL (ref 5–25)
CALCIUM SERPL-MCNC: 9.5 MG/DL (ref 8.3–10.1)
CHLORIDE SERPL-SCNC: 105 MMOL/L (ref 100–108)
CHOLEST SERPL-MCNC: 195 MG/DL (ref 50–200)
CO2 SERPL-SCNC: 28 MMOL/L (ref 21–32)
CREAT SERPL-MCNC: 0.75 MG/DL (ref 0.6–1.3)
EOSINOPHIL # BLD AUTO: 0 THOUSAND/ΜL (ref 0–0.61)
EOSINOPHIL NFR BLD AUTO: 0 % (ref 0–6)
ERYTHROCYTE [DISTWIDTH] IN BLOOD BY AUTOMATED COUNT: 14.1 % (ref 11.6–15.1)
EST. AVERAGE GLUCOSE BLD GHB EST-MCNC: 114 MG/DL
GFR SERPL CREATININE-BSD FRML MDRD: 86 ML/MIN/1.73SQ M
GGT SERPL-CCNC: 55 U/L (ref 5–85)
GLUCOSE P FAST SERPL-MCNC: 115 MG/DL (ref 65–99)
HBA1C MFR BLD: 5.6 % (ref 4.2–6.3)
HCT VFR BLD AUTO: 43.1 % (ref 34.8–46.1)
HDLC SERPL-MCNC: 27 MG/DL (ref 40–60)
HGB BLD-MCNC: 13.7 G/DL (ref 11.5–15.4)
IMM GRANULOCYTES # BLD AUTO: 0.01 THOUSAND/UL (ref 0–0.2)
IMM GRANULOCYTES NFR BLD AUTO: 0 % (ref 0–2)
LDLC SERPL CALC-MCNC: 132 MG/DL (ref 0–100)
LYMPHOCYTES # BLD AUTO: 1.7 THOUSANDS/ΜL (ref 0.6–4.47)
LYMPHOCYTES NFR BLD AUTO: 34 % (ref 14–44)
MCH RBC QN AUTO: 29.2 PG (ref 26.8–34.3)
MCHC RBC AUTO-ENTMCNC: 31.8 G/DL (ref 31.4–37.4)
MCV RBC AUTO: 92 FL (ref 82–98)
MONOCYTES # BLD AUTO: 0.36 THOUSAND/ΜL (ref 0.17–1.22)
MONOCYTES NFR BLD AUTO: 7 % (ref 4–12)
NEUTROPHILS # BLD AUTO: 2.98 THOUSANDS/ΜL (ref 1.85–7.62)
NEUTS SEG NFR BLD AUTO: 59 % (ref 43–75)
NRBC BLD AUTO-RTO: 0 /100 WBCS
PLATELET # BLD AUTO: 161 THOUSANDS/UL (ref 149–390)
PMV BLD AUTO: 9.6 FL (ref 8.9–12.7)
POTASSIUM SERPL-SCNC: 4.3 MMOL/L (ref 3.5–5.3)
PROT SERPL-MCNC: 7.5 G/DL (ref 6.4–8.2)
RBC # BLD AUTO: 4.69 MILLION/UL (ref 3.81–5.12)
SODIUM SERPL-SCNC: 142 MMOL/L (ref 136–145)
TRIGL SERPL-MCNC: 178 MG/DL
TSH SERPL DL<=0.05 MIU/L-ACNC: 2.54 UIU/ML (ref 0.36–3.74)
WBC # BLD AUTO: 5.07 THOUSAND/UL (ref 4.31–10.16)

## 2019-05-21 PROCEDURE — 82306 VITAMIN D 25 HYDROXY: CPT | Performed by: INTERNAL MEDICINE

## 2019-05-21 PROCEDURE — 85025 COMPLETE CBC W/AUTO DIFF WBC: CPT | Performed by: INTERNAL MEDICINE

## 2019-05-21 PROCEDURE — 84443 ASSAY THYROID STIM HORMONE: CPT | Performed by: INTERNAL MEDICINE

## 2019-05-21 PROCEDURE — 36415 COLL VENOUS BLD VENIPUNCTURE: CPT | Performed by: INTERNAL MEDICINE

## 2019-05-21 PROCEDURE — 80061 LIPID PANEL: CPT | Performed by: INTERNAL MEDICINE

## 2019-05-21 PROCEDURE — 82977 ASSAY OF GGT: CPT | Performed by: INTERNAL MEDICINE

## 2019-05-21 PROCEDURE — 80053 COMPREHEN METABOLIC PANEL: CPT | Performed by: INTERNAL MEDICINE

## 2019-05-21 PROCEDURE — 83036 HEMOGLOBIN GLYCOSYLATED A1C: CPT | Performed by: INTERNAL MEDICINE

## 2019-05-29 ENCOUNTER — OFFICE VISIT (OUTPATIENT)
Dept: INTERNAL MEDICINE CLINIC | Facility: CLINIC | Age: 64
End: 2019-05-29
Payer: MEDICARE

## 2019-05-29 VITALS
TEMPERATURE: 98.5 F | BODY MASS INDEX: 38.09 KG/M2 | SYSTOLIC BLOOD PRESSURE: 132 MMHG | HEIGHT: 66 IN | RESPIRATION RATE: 16 BRPM | HEART RATE: 71 BPM | DIASTOLIC BLOOD PRESSURE: 80 MMHG | WEIGHT: 237 LBS

## 2019-05-29 DIAGNOSIS — K21.9 GASTROESOPHAGEAL REFLUX DISEASE, ESOPHAGITIS PRESENCE NOT SPECIFIED: Primary | ICD-10-CM

## 2019-05-29 DIAGNOSIS — I51.7 LEFT VENTRICULAR HYPERTROPHY: ICD-10-CM

## 2019-05-29 DIAGNOSIS — K75.81 STEATOHEPATITIS: ICD-10-CM

## 2019-05-29 DIAGNOSIS — E55.9 VITAMIN D DEFICIENCY: ICD-10-CM

## 2019-05-29 DIAGNOSIS — M85.80 OSTEOPENIA, UNSPECIFIED LOCATION: ICD-10-CM

## 2019-05-29 DIAGNOSIS — E78.5 HYPERLIPIDEMIA, UNSPECIFIED HYPERLIPIDEMIA TYPE: ICD-10-CM

## 2019-05-29 PROCEDURE — 99214 OFFICE O/P EST MOD 30 MIN: CPT | Performed by: INTERNAL MEDICINE

## 2019-05-29 RX ORDER — OMEPRAZOLE 40 MG/1
40 CAPSULE, DELAYED RELEASE ORAL DAILY
Qty: 90 CAPSULE | Refills: 1 | Status: SHIPPED | OUTPATIENT
Start: 2019-05-29 | End: 2020-01-13 | Stop reason: SDUPTHER

## 2019-05-29 RX ORDER — ERGOCALCIFEROL 1.25 MG/1
50000 CAPSULE ORAL WEEKLY
Qty: 12 CAPSULE | Refills: 0 | Status: SHIPPED | OUTPATIENT
Start: 2019-05-29 | End: 2020-01-13

## 2019-05-29 RX ORDER — OMEPRAZOLE 40 MG/1
40 CAPSULE, DELAYED RELEASE ORAL DAILY
Qty: 15 CAPSULE | Refills: 0 | Status: SHIPPED | OUTPATIENT
Start: 2019-05-29 | End: 2019-05-29 | Stop reason: SDUPTHER

## 2019-05-29 RX ORDER — OXYBUTYNIN CHLORIDE 10 MG/1
10 TABLET, EXTENDED RELEASE ORAL DAILY
Refills: 3 | COMMUNITY
Start: 2019-05-21 | End: 2020-12-02

## 2019-07-09 ENCOUNTER — TELEPHONE (OUTPATIENT)
Dept: CARDIOLOGY CLINIC | Facility: CLINIC | Age: 64
End: 2019-07-09

## 2019-07-09 NOTE — TELEPHONE ENCOUNTER
Spoke to pt, advised appt must be cancelled due to medical emergency with Dr Ying Brian  Will be placed on recall list and will be r/s after 8/26/19

## 2019-09-11 ENCOUNTER — OFFICE VISIT (OUTPATIENT)
Dept: INTERNAL MEDICINE CLINIC | Facility: CLINIC | Age: 64
End: 2019-09-11
Payer: MEDICARE

## 2019-09-11 VITALS
RESPIRATION RATE: 15 BRPM | WEIGHT: 223.6 LBS | HEIGHT: 66 IN | BODY MASS INDEX: 35.93 KG/M2 | TEMPERATURE: 99.7 F | SYSTOLIC BLOOD PRESSURE: 149 MMHG | DIASTOLIC BLOOD PRESSURE: 86 MMHG | HEART RATE: 65 BPM

## 2019-09-11 DIAGNOSIS — F41.9 ANXIETY: Primary | ICD-10-CM

## 2019-09-11 DIAGNOSIS — Z23 ENCOUNTER FOR IMMUNIZATION: ICD-10-CM

## 2019-09-11 DIAGNOSIS — F31.9 BIPOLAR 1 DISORDER (HCC): ICD-10-CM

## 2019-09-11 DIAGNOSIS — R79.89 LOW VITAMIN D LEVEL: ICD-10-CM

## 2019-09-11 DIAGNOSIS — E78.5 HYPERLIPIDEMIA, UNSPECIFIED HYPERLIPIDEMIA TYPE: ICD-10-CM

## 2019-09-11 DIAGNOSIS — F43.21 GRIEF: ICD-10-CM

## 2019-09-11 DIAGNOSIS — K75.81 STEATOHEPATITIS: ICD-10-CM

## 2019-09-11 DIAGNOSIS — K52.9 CHRONIC DIARRHEA: ICD-10-CM

## 2019-09-11 DIAGNOSIS — K21.9 GASTROESOPHAGEAL REFLUX DISEASE, ESOPHAGITIS PRESENCE NOT SPECIFIED: ICD-10-CM

## 2019-09-11 DIAGNOSIS — K58.0 IRRITABLE BOWEL SYNDROME WITH DIARRHEA: ICD-10-CM

## 2019-09-11 PROCEDURE — 90682 RIV4 VACC RECOMBINANT DNA IM: CPT | Performed by: INTERNAL MEDICINE

## 2019-09-11 PROCEDURE — 99214 OFFICE O/P EST MOD 30 MIN: CPT | Performed by: INTERNAL MEDICINE

## 2019-09-11 PROCEDURE — G0008 ADMIN INFLUENZA VIRUS VAC: HCPCS | Performed by: INTERNAL MEDICINE

## 2019-09-11 RX ORDER — GABAPENTIN 300 MG/1
CAPSULE ORAL 2 TIMES DAILY
Refills: 0 | COMMUNITY
Start: 2019-07-16

## 2019-09-12 NOTE — PROGRESS NOTES
Assessment/Plan:  Acute grief worsening her anxiety and bipolar symptoms  I suggested adding grief counseling to her regular psychiatry visits  Continue current regimen  Her GI symptoms are also likely due to flare due to increased stress, but will get stool studies to rule out any infectious causes  Will arrange for follow-up with GI for an EGD and colonoscopy if needed  Continue omeprazole for now  Blood pressure elevated during the office visit but seemingly anxious and distressed talking about her , this point will continue monitoring  She will try to reschedule her appointment with her cardiologist as well  Get blood work that was ordered at last visit, add stool testing  Influenza vaccine was given today  Diagnoses and all orders for this visit:    Anxiety    Grief    Hyperlipidemia, unspecified hyperlipidemia type    Steatohepatitis    Chronic diarrhea  -     Clostridium difficile toxin by PCR  -     Ova and parasite examination  -     Stool Enteric Bacterial Panel by PCR  -     Ambulatory referral to Gastroenterology; Future    Irritable bowel syndrome with diarrhea  -     Ambulatory referral to Gastroenterology; Future    Gastroesophageal reflux disease, esophagitis presence not specified  -     Ambulatory referral to Gastroenterology; Future    Bipolar 1 disorder (HCC)    Low vitamin D level    Encounter for immunization  -     influenza vaccine, 3507-1072, quadrivalent, recombinant, PF, 0 5 mL, for patients 18 yr+ (FLUBLOK)    Other orders  -     gabapentin (NEURONTIN) 300 mg capsule; TAKE 1 CAPSULE BY MOUTH EVERY DAY AT NIGHT  -     lurasidone (LATUDA) 20 mg tablet; Take 20 mg by mouth daily with breakfast          Subjective:   Chief Complaint   Patient presents with    Follow-up     3-4 month        Patient ID: Prince Hagan is a 60 y o  female  She comes in for follow up of elevated BP, bipolar, GERD, HLD, obesity, chronic back pain       In July, she witnessed her  have a sudden cardiac arrest   She notes that his physicians were able to revive her pulse but he was considered to be have anoxic brain injury and hands life support was withdrawn  Since then she has been having significant depression, anxiety  She had several weeks shaking of her hands, she worked with her psychiatrist who reduced her latuda doses  She has also concerned about her son's illness  He has bipolar disease and has been somewhat paranoid  She notes that she had to call the police  His symptoms seem to be fine a feeling a little better but she is fearful after his health  She scared of being by herself but sometimes her son gets very aggressive  She is having a hard time find managing the finances with some financial struggles since the death of her     She notes that she has been having quite a bit of acid reflux despite taking the omeprazole, she has been having loose bowel movements every day  The following portions of the patient's history were reviewed and updated as appropriate: current medications, past medical history, past social history and past surgical history      PHQ-9 Depression Screening    PHQ-9:    Frequency of the following problems over the past two weeks:                Current Outpatient Medications:     acetaminophen (TYLENOL) 500 mg tablet, 2 tablet po 3 times q day, Disp: , Rfl:     clonazePAM (KlonoPIN) 0 5 MG disintegrating tablet, Take 0 5 mg by mouth 2 (two) times a day, Disp: , Rfl:     desvenlafaxine (PRISTIQ) 100 mg 24 hr tablet, Take 100 mg by mouth daily  , Disp: , Rfl:     gabapentin (NEURONTIN) 300 mg capsule, TAKE 1 CAPSULE BY MOUTH EVERY DAY AT NIGHT, Disp: , Rfl: 0    lurasidone (LATUDA) 20 mg tablet, Take 20 mg by mouth daily with breakfast, Disp: , Rfl:     Omega-3 Fatty Acids (FISH OIL PO), Take 1 capsule by mouth daily  , Disp: , Rfl:     omeprazole (PriLOSEC) 40 MG capsule, Take 1 capsule (40 mg total) by mouth daily, Disp: 90 capsule, Rfl: 1    OXcarbazepine (TRILEPTAL) 300 mg tablet, Take 300 mg by mouth 2 (two) times a day  , Disp: , Rfl:     oxybutynin (DITROPAN-XL) 10 MG 24 hr tablet, Take 10 mg by mouth daily, Disp: , Rfl: 3    propranolol (INDERAL) 20 mg tablet, Take 1 tablet (20 mg total) by mouth every 12 (twelve) hours for 95 days, Disp: 180 tablet, Rfl: 0    ergocalciferol (VITAMIN D2) 50,000 units, Take 1 capsule (50,000 Units total) by mouth once a week (Patient not taking: Reported on 9/11/2019), Disp: 12 capsule, Rfl: 0    Multiple Vitamins-Minerals (PRESERVISION AREDS 2 PO), Take 1 capsule by mouth daily, Disp: , Rfl:     Review of Systems   Constitutional: Positive for fatigue  Negative for fever and unexpected weight change  HENT: Negative for ear pain, hearing loss and sore throat  Eyes: Negative for pain and discharge  Respiratory: Negative for cough, chest tightness and shortness of breath  Cardiovascular: Negative for chest pain and palpitations  Gastrointestinal: Positive for abdominal pain and diarrhea  Negative for blood in stool, constipation and nausea  Genitourinary: Negative for dysuria, frequency and hematuria  Musculoskeletal: Positive for back pain  Negative for arthralgias and joint swelling  Skin: Negative for rash  Allergic/Immunologic: Negative for immunocompromised state  Neurological: Negative for dizziness and headaches  Hematological: Negative for adenopathy  Psychiatric/Behavioral: Positive for dysphoric mood and sleep disturbance  Negative for confusion  The patient is nervous/anxious  Objective:  /86 (BP Location: Left arm, Patient Position: Sitting, Cuff Size: Large)   Pulse 65   Temp 99 7 °F (37 6 °C)   Resp 15   Ht 5' 6" (1 676 m)   Wt 101 kg (223 lb 9 6 oz)   BMI 36 09 kg/m²      Physical Exam   Constitutional: She appears well-developed and well-nourished  HENT:   Head: Normocephalic and atraumatic     Right Ear: Tympanic membrane normal    Left Ear: Tympanic membrane normal    Nose: Nose normal    Mouth/Throat: Oropharynx is clear and moist  No posterior oropharyngeal edema or posterior oropharyngeal erythema  Eyes: Pupils are equal, round, and reactive to light  Conjunctivae are normal  Right eye exhibits no discharge  Left eye exhibits no discharge  Neck: Normal range of motion  Neck supple  No thyromegaly present  Cardiovascular: Normal rate, regular rhythm, S1 normal, S2 normal and normal heart sounds  PMI is not displaced  No murmur heard  Pulmonary/Chest: Effort normal and breath sounds normal  No accessory muscle usage  No apnea  No respiratory distress  She has no rhonchi  She has no rales  Abdominal: Soft  Normal appearance and bowel sounds are normal  She exhibits no shifting dullness  There is no hepatosplenomegaly  There is no tenderness  There is no rebound and no CVA tenderness  Musculoskeletal: Normal range of motion  She exhibits no edema or tenderness  Lymphadenopathy:     She has no cervical adenopathy  Neurological: She is alert  Skin: Skin is warm and intact  No rash noted  Psychiatric: Her speech is normal  Her mood appears anxious  She exhibits a depressed mood  Nursing note and vitals reviewed  No results found for this or any previous visit (from the past 1008 hour(s))  ]    No results found

## 2019-10-14 DIAGNOSIS — I10 ESSENTIAL HYPERTENSION: ICD-10-CM

## 2019-10-15 RX ORDER — PROPRANOLOL HYDROCHLORIDE 20 MG/1
20 TABLET ORAL EVERY 12 HOURS SCHEDULED
Qty: 180 TABLET | Refills: 1 | Status: SHIPPED | OUTPATIENT
Start: 2019-10-15 | End: 2020-05-04 | Stop reason: SDUPTHER

## 2019-10-23 ENCOUNTER — OFFICE VISIT (OUTPATIENT)
Dept: CARDIOLOGY CLINIC | Facility: CLINIC | Age: 64
End: 2019-10-23
Payer: MEDICARE

## 2019-10-23 VITALS
SYSTOLIC BLOOD PRESSURE: 130 MMHG | DIASTOLIC BLOOD PRESSURE: 70 MMHG | WEIGHT: 222 LBS | HEIGHT: 66 IN | BODY MASS INDEX: 35.68 KG/M2 | HEART RATE: 68 BPM

## 2019-10-23 DIAGNOSIS — R00.2 PALPITATIONS: Primary | ICD-10-CM

## 2019-10-23 PROCEDURE — 99214 OFFICE O/P EST MOD 30 MIN: CPT | Performed by: INTERNAL MEDICINE

## 2019-10-23 RX ORDER — PROPRANOLOL HYDROCHLORIDE 20 MG/1
20 TABLET ORAL EVERY 12 HOURS SCHEDULED
Qty: 30 TABLET | Refills: 1 | Status: SHIPPED | OUTPATIENT
Start: 2019-10-23 | End: 2020-01-13

## 2019-10-23 NOTE — ASSESSMENT & PLAN NOTE
History of mitral valve prolapse and left ventricular hypertrophy in the past    last echocardiogram in February showed showed no evidence of prolapse  Blood pressure is reasonably well controlled  Unfortunately she is going through severe depression and grieving relating to her 's recent death  - no further workup is needed at this time

## 2019-10-23 NOTE — ASSESSMENT & PLAN NOTE
History of palpitations, controlled  No specific arrhythmia identified previously  Holter monitor in 2016 showed no significant arrhythmia  She has been on propranolol and tolerating it  - Patient is advised to continue current medical therapy  - Dietary and medical compliance are reinforced  - Advised  to report any concerning symptoms such as chest pain, shortness of breath, decline in exercise tolerance or presyncope/syncope  - reinforced with her the importance of quitting smoking  Will have to address this when her depression is slightly more stable

## 2019-10-23 NOTE — PATIENT INSTRUCTIONS
CARDIOLOGY ASSESSMENT & PLAN:  1  Palpitations  propranolol (INDERAL) 20 mg tablet     Mitral valve prolapse   History of mitral valve prolapse and left ventricular hypertrophy in the past    last echocardiogram in February showed showed no evidence of prolapse  Blood pressure is reasonably well controlled  Unfortunately she is going through severe depression and grieving relating to her 's recent death  - no further workup is needed at this time  Palpitations  History of palpitations, controlled  No specific arrhythmia identified previously  Holter monitor in 2016 showed no significant arrhythmia  She has been on propranolol and tolerating it  - Patient is advised to continue current medical therapy  - Dietary and medical compliance are reinforced  - Advised  to report any concerning symptoms such as chest pain, shortness of breath, decline in exercise tolerance or presyncope/syncope  - reinforced with her the importance of quitting smoking  Will have to address this when her depression is slightly more stable

## 2019-10-23 NOTE — PROGRESS NOTES
[facility]  [id number] [facility], [place] [address]  Phone#  [phone]  Fax#  [phone]  *-*-*-*-*-*-*-*-*-*-*-*-*-*-*-*-*-*-*-*-*-*-*-*-*-*-*-*-*-*-*-*-*-*-*-*-*-*-*-*-*-*-*-*-*-*-*-*-*-*-*-*-*-*  Luis Kaur DATE: 10/23/19 1:34 PM  PATIENT NAME: Caitlin Holly   1955    [id number]  Age: 60 y o  Sex: female  AUTHOR: Nathalia Delcid MD  PRIMARYCARE PHYSICIAN: Crystal Son MD    DIAGNOSES:  1  History of mitral valve prolapse by echocardiogram in the past  2  Benign essential hypertension  3  Hypertensive heart disease  4  History of depression and mood disorder  5  Osteopenia  6  History of tibial and fibular fracture in 2004  7  History of hysterectomy in the past  8  Degenerative joint disease status post fusion surgery of the lumbar spine  9  Irritable bowel syndrome  10  History of splenomegaly 11  Asymptomatic bradycardia  12  Mixed dyslipidemia    Echocardiogram February 2019:  1  Mild concentric left ventricular hypertrophy, normal left ventricular systolic function, EF around 60-65%  Normal diastolic function  2  No other significant chamber hypertrophy or enlargement  3  Aortic valve sclerosis, no stenosis or regurgitation  4  Anterior mitral valve leaflet sclerosis, trace mitral valve regurgitation  5  Trace tricuspid and pulmonic valve regurgitation  6  No obvious pulmonary hypertension  7  No significant pericardial effusion  8  Small inferior vena cava suggestive of low intravascular volume      Compared to previous echocardiogram from March 22, 2016 there is overall no significant change  Holter monitor in August 2016 showed predominant sinus rhythm with average heart rate of 76 beats per minute with no significant arrhythmia  Patient had single episode of palpitation during the monitoring, rhythm was normal sinus  No recent echocardiogram    CURRENT ECG:  No results found for this visit on 10/23/19  CARDIOLOGY ASSESSMENT & PLAN:  1   Palpitations propranolol (INDERAL) 20 mg tablet     Mitral valve prolapse   History of mitral valve prolapse and left ventricular hypertrophy in the past    last echocardiogram in February showed showed no evidence of prolapse  Blood pressure is reasonably well controlled  Unfortunately she is going through severe depression and grieving relating to her 's recent death  - no further workup is needed at this time  Palpitations  History of palpitations, controlled  No specific arrhythmia identified previously  Holter monitor in 2016 showed no significant arrhythmia  She has been on propranolol and tolerating it  - Patient is advised to continue current medical therapy  - Dietary and medical compliance are reinforced  - Advised  to report any concerning symptoms such as chest pain, shortness of breath, decline in exercise tolerance or presyncope/syncope  - reinforced with her the importance of quitting smoking  Will have to address this when her depression is slightly more stable  INTERVAL HISTORY & HISTORY OF PRESENT ILLNESS:  Ivania Denise is here for follow-up regarding her cardiac comorbidities which include:  History of palpitations, hypertension, suspected mitral valve prolapse in the past but not recently  Patient is here for follow-up  Unfortunately her  recently passed away due to sudden cardiac death to which she was a Witness  She is going through the grieving at this time  Her baseline depression is slightly exacerbated  She does have good support and is under the care of Psychiatrist and counsellor  Her main symptom has been severe depression and palpitations on and off  She has had no presyncope/syncope  She has lost approximately 13496 lb since last visit in December 2018  Denies any orthopnea, PND or worsening pedal edema  She has had no recent hospitalizations    She does mention that her anti psychotic medications have been changed and up titrated recently  She lives with her son  She is worried about her own health  Unfortunately she smoking a pack of cigarettes a day which she relates to her depression  She does have difficulty in dealing with her son who has ADHD and bipolar disorder    REVIEW OF SYMPTOMS:    Positive for:  Severe depression  Negative for: All remaining as reviewed below and in HPI  SYSTEM SYMPTOMS REVIEWED:  General--weight change, fever, night sweats  Respiratory--cough, wheezing, shortness of breath, sputum production  Cardiovascular--chest pain, syncope, dyspnea on exertion, edema, decline in exercise tolerance, claudication   Gastrointestinal--persistent vomiting, diarrhea, abdominal distention, blood in stool   Muscular or skeletal--joint pain or swelling   Neurologic--headaches, syncope, abnormal movement  Hematologic--history of easy bruising and bleeding   Endocrine--thyroid enlargement, heat or cold intolerance, polyuria   Psychiatric--anxiety, depression     *-*-*-*-*-*-*-*-*-*-*-*-*-*-*-*-*-*-*-*-*-*-*-*-*-*-*-*-*-*-*-*-*-*-*-*-*-*-*-*-*-*-*-*-*-*-*-*-*-*-*-*-*-*-  VITAL SIGNS:  Vitals:    10/23/19 1256   BP: 130/70   BP Location: Left arm   Patient Position: Sitting   Cuff Size: Large   Pulse: 68   Weight: 101 kg (222 lb)   Height: 5' 6" (1 676 m)     Weight (last 2 days)     Date/Time   Weight    10/23/19 1256   101 (222)           ,   Wt Readings from Last 3 Encounters:   10/23/19 101 kg (222 lb)   09/11/19 101 kg (223 lb 9 6 oz)   05/29/19 108 kg (237 lb)    , Body mass index is 35 83 kg/m²  *-*-*-*-*-*-*-*-*-*-*-*-*-*-*-*-*-*-*-*-*-*-*-*-*-*-*-*-*-*-*-*-*-*-*-*-*-*-*-*-*-*-*-*-*-*-*-*-*-*-*-*-*-*-  PHYSICAL EXAM:  General Appearance:    Alert, cooperative, no distress, appears stated age, slightly obese   Head, Eyes, ENT:    No obvious abnormality, moist mucous mebranes  Neck:   Supple, no carotid bruit or JVD   Back:     Symmetric, no curvature  Lungs:     Respirations unlabored   Clear to auscultation bilaterally,    Chest wall:    No tenderness or deformity   Heart:    Regular rate and rhythm, S1 and S2 normal, no murmur, rub  or gallop     Abdomen:     Soft, non-tender, No obvious masses, or organomegaly   Extremities:   Extremities normal, no cyanosis or edema    Skin:   Skin color, texture, turgor normal, no rashes or lesions     *-*-*-*-*-*-*-*-*-*-*-*-*-*-*-*-*-*-*-*-*-*-*-*-*-*-*-*-*-*-*-*-*-*-*-*-*-*-*-*-*-*-*-*-*-*-*-*-*-*-*-*-*-*-  CURRENT MEDICATION LIST:    Current Outpatient Medications:     acetaminophen (TYLENOL) 500 mg tablet, 2 tablet po 3 times q day, Disp: , Rfl:     clonazePAM (KlonoPIN) 0 5 MG disintegrating tablet, Take 0 5 mg by mouth 2 (two) times a day, Disp: , Rfl:     desvenlafaxine (PRISTIQ) 100 mg 24 hr tablet, Take 100 mg by mouth daily  , Disp: , Rfl:     gabapentin (NEURONTIN) 300 mg capsule, 2 (two) times a day , Disp: , Rfl: 0    lurasidone (LATUDA) 20 mg tablet, Take 20 mg by mouth daily with breakfast, Disp: , Rfl:     Multiple Vitamins-Minerals (PRESERVISION AREDS 2 PO), Take 1 capsule by mouth daily, Disp: , Rfl:     Omega-3 Fatty Acids (FISH OIL PO), Take 1 capsule by mouth daily  , Disp: , Rfl:     omeprazole (PriLOSEC) 40 MG capsule, Take 1 capsule (40 mg total) by mouth daily, Disp: 90 capsule, Rfl: 1    OXcarbazepine (TRILEPTAL) 300 mg tablet, Take 300 mg by mouth 2 (two) times a day  , Disp: , Rfl:     oxybutynin (DITROPAN-XL) 10 MG 24 hr tablet, Take 10 mg by mouth daily, Disp: , Rfl: 3    propranolol (INDERAL) 20 mg tablet, Take 1 tablet (20 mg total) by mouth every 12 (twelve) hours, Disp: 180 tablet, Rfl: 1    ergocalciferol (VITAMIN D2) 50,000 units, Take 1 capsule (50,000 Units total) by mouth once a week (Patient not taking: Reported on 9/11/2019), Disp: 12 capsule, Rfl: 0    propranolol (INDERAL) 20 mg tablet, Take 1 tablet (20 mg total) by mouth every 12 (twelve) hours, Disp: 30 tablet, Rfl: 1    ALLERGIES:  Allergies   Allergen Reactions    Bupropion     Corticosteroids     Erythromycin     Escitalopram     Tetracycline      Other reaction(s): Other (See Comments)  Mouth sores    Other Palpitations     "Steroids"        *-*-*-*-*-*-*-*-*-*-*-*-*-*-*-*-*-*-*-*-*-*-*-*-*-*-*-*-*-*-*-*-*-*-*-*-*-*-*-*-*-*-*-*-*-*-*-*-*-*-*-*-*-*-  The LABORATORY DATA:  I have personally reviewed pertinent labs  Potassium   Date Value Ref Range Status   05/21/2019 4 3 3 5 - 5 3 mmol/L Final   10/31/2018 3 9 3 5 - 5 3 mmol/L Final     Chloride   Date Value Ref Range Status   05/21/2019 105 100 - 108 mmol/L Final   10/31/2018 104 100 - 108 mmol/L Final     CO2   Date Value Ref Range Status   05/21/2019 28 21 - 32 mmol/L Final   10/31/2018 29 21 - 32 mmol/L Final     BUN   Date Value Ref Range Status   05/21/2019 8 5 - 25 mg/dL Final   10/31/2018 7 5 - 25 mg/dL Final     Creatinine   Date Value Ref Range Status   05/21/2019 0 75 0 60 - 1 30 mg/dL Final     Comment:     Standardized to IDMS reference method   10/31/2018 0 74 0 60 - 1 30 mg/dL Final     Comment:     Standardized to IDMS reference method     eGFR   Date Value Ref Range Status   05/21/2019 86 ml/min/1 73sq m Final   10/31/2018 88 ml/min/1 73sq m Final     Calcium   Date Value Ref Range Status   05/21/2019 9 5 8 3 - 10 1 mg/dL Final   10/31/2018 8 8 8 3 - 10 1 mg/dL Final     AST   Date Value Ref Range Status   05/21/2019 75 (H) 5 - 45 U/L Final     Comment:       Specimen collection should occur prior to Sulfasalazine administration due to the potential for falsely depressed results  10/31/2018 39 5 - 45 U/L Final     Comment:       Specimen collection should occur prior to Sulfasalazine administration due to the potential for falsely depressed results  ALT   Date Value Ref Range Status   05/21/2019 70 12 - 78 U/L Final     Comment:       Specimen collection should occur prior to Sulfasalazine administration due to the potential for falsely depressed results      10/31/2018 50 12 - 78 U/L Final     Comment:       Specimen collection should occur prior to Sulfasalazine administration due to the potential for falsely depressed results  Alkaline Phosphatase   Date Value Ref Range Status   05/21/2019 73 46 - 116 U/L Final   10/31/2018 86 46 - 116 U/L Final     WBC   Date Value Ref Range Status   05/21/2019 5 07 4 31 - 10 16 Thousand/uL Final   10/31/2018 5 15 4 31 - 10 16 Thousand/uL Final     Hemoglobin   Date Value Ref Range Status   05/21/2019 13 7 11 5 - 15 4 g/dL Final   10/31/2018 13 3 11 5 - 15 4 g/dL Final     Platelets   Date Value Ref Range Status   05/21/2019 161 149 - 390 Thousands/uL Final   10/31/2018 188 149 - 390 Thousands/uL Final     INR   Date Value Ref Range Status   10/31/2018 1 07 0 86 - 1 17 Final     No results found for: CKMB, DIGOXIN  No results found for: TSH  HDL, Direct   Date Value Ref Range Status   05/21/2019 27 (L) 40 - 60 mg/dL Final     Comment:       HDL Cholesterol:       High    >60 mg/dL       Low     <41 mg/dL  Specimen collection should occur prior to Metamizole administration due to the potential for falsley depressed results  Triglycerides   Date Value Ref Range Status   05/21/2019 178 (H) <=150 mg/dL Final     Comment:       Triglyceride:     Normal          <150 mg/dl     Borderline High 150-199 mg/dl     High            200-499 mg/dl        Very High       >499 mg/dl    Specimen collection should occur prior to N-Acetylcysteine or Metamizole administration due to the potential for falsely depressed results  Hemoglobin A1C   Date Value Ref Range Status   05/21/2019 5 6 4 2 - 6 3 % Final     Urine Culture   Date Value Ref Range Status   10/31/2018 (A)  Final    >100,000 cfu/ml Beta Hemolytic Streptococcus Group B     Comment: This organism is intrinisically susceptible to Penicillin  If sensitivites to other antibiotics are required, please call the Microbiology Department at [phone] within 5 days     10/31/2018 20,000-29,000 cfu/ml Final     Comment:     Mixed Contaminants X3       *-*-*-*-*-*-*-*-*-*-*-*-*-*-*-*-*-*-*-*-*-*-*-*-*-*-*-*-*-*-*-*-*-*-*-*-*-*-*-*-*-*-*-*-*-*-*-*-*-*-*-*-*-*-  PREVIOUS CARDIOLOGY & RADIOLOGY RESULTS:  Results for orders placed during the hospital encounter of 02/15/19   Echo complete with contrast if indicated    Narrative [address]  [facility], [address] [place] [address]    Transthoracic Echocardiogram  2D, M-mode, Doppler, and Color Doppler    Study date:  15-Feb-2019    Patient: Janelle Manuel  MR number: [id number]  Account number: [de-identified]  : 1955  Age: 66 years  Gender: Female  Status: Outpatient  Location: [address] [place] [address]  Height: 65 in  Weight: 232 5 lb  BP: 122/ 60 mmHg    Indications: Hypertension    Diagnoses: I11 9 - Hypertensive heart disease without heart failure    Sonographer:  Kiko Gupta  Primary Physician:  Leyda Ignacio MD  Referring Physician:  Joshua Gonzáles MD  Group:  [facility] [id number] [facility]  Interpreting Physician:  Joshua Gonzáles MD    IMPRESSIONS:  1  Mild concentric left ventricular hypertrophy, normal left ventricular systolic function, EF around 60-65%  Normal diastolic function  2  No other significant chamber hypertrophy or enlargement  3  Aortic valve sclerosis, no stenosis or regurgitation  4  Anterior mitral valve leaflet sclerosis, trace mitral valve regurgitation  5  Trace tricuspid and pulmonic valve regurgitation  6  No obvious pulmonary hypertension  7  No significant pericardial effusion  8  Small inferior vena cava suggestive of low intravascular volume  Compared to previous echocardiogram from 2016 there is overall no significant change  SUMMARY    LEFT VENTRICLE:  Normal left ventricular cavity size, mild concentric left ventricular hypertrophy, normal left ventricle systolic function and wall motion  Ejection fraction is estimated as around 60-65%   Normal diastolic function  RIGHT VENTRICLE:  Normal right ventricular size and systolic function  Normal estimated right ventricular systolic pressure  LEFT ATRIUM:  Normal left atrial cavity size  Intact interatrial septum  RIGHT ATRIUM:  Normal right atrial cavity size  MITRAL VALVE:  Mild anterior mitral valve leaflet sclerosis, adequate leaflet mobility  Trace mitral valve regurgitation  AORTIC VALVE:  Tricuspid aortic valve with mild sclerosis  No aortic valve stenosis or regurgitation  TRICUSPID VALVE:  Trace tricuspid valve regurgitation  PULMONIC VALVE:  Trace pulmonic valve regurgitation  AORTA:  Aortic root and proximal ascending aorta are normal in size on 2D imaging  IVC, HEPATIC VEINS:  Collapsed inferior vena cava suggestive of low intravascular volume  PERICARDIUM:  No significant pericardial effusion  HISTORY: PRIOR HISTORY: Mitral valve prolapse, hypertension, bradycardia, smoker, palpitation    PROCEDURE: The study was performed in the [facility]  This was a routine study  The transthoracic approach was used  The study included complete 2D imaging, M-mode, complete spectral Doppler, and color Doppler  The heart rate was  64 bpm, at the start of the study  Images were obtained from the parasternal, apical, subcostal, and suprasternal notch acoustic windows  Image quality was adequate  LEFT VENTRICLE: Normal left ventricular cavity size, mild concentric left ventricular hypertrophy, normal left ventricle systolic function and wall motion  Ejection fraction is estimated as around 60-65%  Normal diastolic function  RIGHT VENTRICLE: Normal right ventricular size and systolic function  Normal estimated right ventricular systolic pressure  LEFT ATRIUM: Normal left atrial cavity size  Intact interatrial septum  RIGHT ATRIUM: Normal right atrial cavity size  MITRAL VALVE: Mild anterior mitral valve leaflet sclerosis, adequate leaflet mobility   Trace mitral valve regurgitation  AORTIC VALVE: Tricuspid aortic valve with mild sclerosis  No aortic valve stenosis or regurgitation  TRICUSPID VALVE: Trace tricuspid valve regurgitation  PULMONIC VALVE: Trace pulmonic valve regurgitation  PERICARDIUM: No significant pericardial effusion  AORTA: Aortic root and proximal ascending aorta are normal in size on 2D imaging  SYSTEMIC VEINS: IVC: Collapsed inferior vena cava suggestive of low intravascular volume  SYSTEM MEASUREMENT TABLES    2D  %FS: 35 22 %  Ao Diam: 2 86 cm  EDV(Teich): 100 48 ml  EF(Teich): 64 58 %  ESV(Teich): 35 59 ml  IVSd: 1 21 cm  LA Area: 13 42 cm2  LA Diam: 4 07 cm  LVEDV MOD A4C: 80 63 ml  LVEF MOD A4C: 63 42 %  LVESV MOD A4C: 29 5 ml  LVIDd: 4 66 cm  LVIDs: 3 02 cm  LVLd A4C: 7 cm  LVLs A4C: 6 03 cm  LVPWd: 1 2 cm  RA Area: 14 29 cm2  RVIDd: 3 43 cm  SV MOD A4C: 51 13 ml  SV(Teich): 64 89 ml    PW  E': 0 05 m/s  E/E': 13 78  MV A Zeke: 0 87 m/s  MV Dec Leflore: 3 18 m/s2  MV DecT: 225 23 ms  MV E Zeke: 0 72 m/s  MV E/A Ratio: 0 82  MV PHT: 65 32 ms  MVA By PHT: 3 37 cm2  PRend P 47 mmHg  PRend Vmax: 0 79 m/s    [employer] Accredited Echocardiography Laboratory    Prepared and electronically signed by    Nati Gordon MD  Signed 2019 18:29:33       No results found for this or any previous visit  No results found for this or any previous visit  No results found for this or any previous visit  Mammo screening bilateral w cad  Narrative: DIAGNOSIS: Screening for malignant neoplasm of breast     RELEVANT HISTORY:   Family Breast Cancer History: No known family history of breast cancer  Family Medical history: No relevant family history has been documented for   this patient  Personal History: No relevant hormone history has been documented for this   patient  Surgical history includes hysterectomy  No relevant medical   history has been documented for this patient      COMPARISONS:  Multiple prior exams dating between 06/23/2009 and   03/10/2016  INDICATION: Cole Blair is a 60 y o  female presenting for   screening mammography  TECHNIQUE:  The current study was evaluated with Computer Aided Detection  TISSUE DENSITY:   There are scattered areas of fibroglandular density  The patient is scheduled in a reminder system for screening mammography  8-10% of cancers will be missed on mammography  Management of a palpable   abnormality must be based on clinical grounds  Patients will be notified   of their results via letter from our facility  Accredited by [employer] and FDA  RISK ASSESSMENT:   5 Year Tyrer-Cuzick: 1 42 %  10 Year Tyrer-Cuzick: 2 74 %  Lifetime Tyrer-Cuzick: 6 39 %    FINDINGS:   Bilateral  There are no suspicious masses, grouped microcalcifications or areas of   architectural distortion  The skin and nipple areolar complex are   unremarkable  A few diffusely distributed calcifications are noted in   each breast   Impression: No mammographic evidence of malignancy  ASSESSMENT/BI-RADS CATEGORY:  Left: 2 - Benign  Right: 2 - Benign  Overall: 2 - Benign    RECOMMENDATION:       - Routine screening mammogram in 1 year for both breasts      Workstation ID: [id number]        *-*-*-*-*-*-*-*-*-*-*-*-*-*-*-*-*-*-*-*-*-*-*-*-*-*-*-*-*-*-*-*-*-*-*-*-*-*-*-*-*-*-*-*-*-*-*-*-*-*-*-*-*-*-  SIGNATURES:   [unfilled]   Nathalia Delcid MD     *-*-*-*-*-*-*-*-*-*-*-*-*-*-*-*-*-*-*-*-*-*-*-*-*-*-*-*-*-*-*-*-*-*-*-*-*-*-*-*-*-*-*-*-*-*-*-*-*-*-*-*-*-*-    Social History     Socioeconomic History    Marital status: /Civil Union     Spouse name: Not on file    Number of children: Not on file    Years of education: Not on file    Highest education level: Not on file   Occupational History    Not on file   Social Needs    Financial resource strain: Not on file    Food insecurity:     Worry: Not on file     Inability: Not on file    Transportation needs:     Medical: Not on file Non-medical: Not on file   Tobacco Use    Smoking status: Former Smoker     Packs/day: 1 00     Types: Cigarettes     Last attempt to quit: 2018     Years since quittin 8    Smokeless tobacco: Never Used   Substance and Sexual Activity    Alcohol use: Yes     Comment: occ drinker    Drug use: No    Sexual activity: Not Currently   Lifestyle    Physical activity:     Days per week: Not on file     Minutes per session: Not on file    Stress: Not on file   Relationships    Social connections:     Talks on phone: Not on file     Gets together: Not on file     Attends L[employer]-day service: Not on file     Active member of club or organization: Not on file     Attends meetings of clubs or organizations: Not on file     Relationship status: Not on file    Intimate partner violence:     Fear of current or ex partner: Not on file     Emotionally abused: Not on file     Physically abused: Not on file     Forced sexual activity: Not on file   Other Topics Concern    Not on file   Social History Narrative    Not on file      Family History   Problem Relation Age of Onset    Hypertension Mother     Anxiety disorder Mother     Depression Mother     Aneurysm Mother     Depression Father     Hypertension Father     Peripheral vascular disease Father     Anxiety disorder Father     Lung cancer Father      Past Surgical History:   Procedure Laterality Date    HYSTERECTOMY      TIBIA FRACTURE SURGERY Left

## 2020-01-13 ENCOUNTER — OFFICE VISIT (OUTPATIENT)
Dept: INTERNAL MEDICINE CLINIC | Facility: CLINIC | Age: 65
End: 2020-01-13
Payer: MEDICARE

## 2020-01-13 VITALS
WEIGHT: 218.6 LBS | RESPIRATION RATE: 15 BRPM | HEIGHT: 66 IN | HEART RATE: 66 BPM | TEMPERATURE: 99 F | DIASTOLIC BLOOD PRESSURE: 85 MMHG | BODY MASS INDEX: 35.13 KG/M2 | SYSTOLIC BLOOD PRESSURE: 138 MMHG

## 2020-01-13 DIAGNOSIS — M25.522 LEFT ELBOW PAIN: ICD-10-CM

## 2020-01-13 DIAGNOSIS — M85.80 OSTEOPENIA, UNSPECIFIED LOCATION: ICD-10-CM

## 2020-01-13 DIAGNOSIS — K21.9 GASTROESOPHAGEAL REFLUX DISEASE, ESOPHAGITIS PRESENCE NOT SPECIFIED: Primary | ICD-10-CM

## 2020-01-13 DIAGNOSIS — I51.7 LEFT VENTRICULAR HYPERTROPHY: ICD-10-CM

## 2020-01-13 DIAGNOSIS — R79.89 LOW VITAMIN D LEVEL: ICD-10-CM

## 2020-01-13 DIAGNOSIS — F31.81 BIPOLAR 2 DISORDER (HCC): ICD-10-CM

## 2020-01-13 DIAGNOSIS — F41.9 ANXIETY: ICD-10-CM

## 2020-01-13 DIAGNOSIS — E78.5 HYPERLIPIDEMIA, UNSPECIFIED HYPERLIPIDEMIA TYPE: ICD-10-CM

## 2020-01-13 DIAGNOSIS — E55.9 VITAMIN D DEFICIENCY: ICD-10-CM

## 2020-01-13 PROCEDURE — 99214 OFFICE O/P EST MOD 30 MIN: CPT | Performed by: INTERNAL MEDICINE

## 2020-01-13 RX ORDER — OMEPRAZOLE 40 MG/1
40 CAPSULE, DELAYED RELEASE ORAL DAILY
Qty: 90 CAPSULE | Refills: 1 | Status: SHIPPED | OUTPATIENT
Start: 2020-01-13

## 2020-01-13 RX ORDER — BENZTROPINE MESYLATE 0.5 MG/1
0.5 TABLET ORAL DAILY
COMMUNITY
Start: 2019-12-27 | End: 2020-01-26

## 2020-01-13 RX ORDER — CELECOXIB 200 MG/1
200 CAPSULE ORAL
COMMUNITY

## 2020-01-13 RX ORDER — OXYCODONE HYDROCHLORIDE AND ACETAMINOPHEN 5; 325 MG/1; MG/1
1 TABLET ORAL EVERY 4 HOURS PRN
COMMUNITY

## 2020-01-13 RX ORDER — ERGOCALCIFEROL 1.25 MG/1
50000 CAPSULE ORAL WEEKLY
Qty: 12 CAPSULE | Refills: 0 | Status: SHIPPED | OUTPATIENT
Start: 2020-01-13 | End: 2020-11-13 | Stop reason: SDUPTHER

## 2020-01-13 NOTE — PROGRESS NOTES
Assessment/Plan:  1  Gastroesophageal reflux disease, esophagitis presence not specified  Assessment & Plan:  Continue omeprazole  Orders:  -     omeprazole (PriLOSEC) 40 MG capsule; Take 1 capsule (40 mg total) by mouth daily  -     CBC and differential  -     TSH, 3rd generation with Free T4 reflex    2  Left ventricular hypertrophy  Assessment & Plan:  Blood pressure stable  Follows up with cardiology    Orders:  -     CBC and differential  -     Comprehensive metabolic panel  -     Magnesium    3  Anxiety    4  Bipolar 2 disorder [facility])  Assessment & Plan:  Continue follow-up with psychiatrist in behavioral therapist     Orders:  -     TSH, 3rd generation with Free T4 reflex    5  Low vitamin D level  -     Vitamin D 25 hydroxy    6  Hyperlipidemia, unspecified hyperlipidemia type  Assessment & Plan: Will check lipid panel  Orders:  -     Lipid Panel with Direct LDL reflex  -     TSH, 3rd generation with Free T4 reflex  -     Magnesium    7  Vitamin D deficiency  -     ergocalciferol (VITAMIN D2) 50,000 units; Take 1 capsule (50,000 Units total) by mouth once a week    8  Left elbow pain    9  Osteopenia, unspecified location  Assessment & Plan:  Check vitamin-D level, supplement for now          Subjective:   Chief Complaint   Patient presents with    Transition of Care Management     D/c [facility] Adult transitions on 1/3/2020        Patient ID: Karen Loredo is a 60 y o  female  TCM Call (since 12/13/2019)     None      TCM Call (since 12/13/2019)     None          She comes in for follow up of elevated BP, bipolar, GERD, HLD, obesity, chronic back pain  Went to acute pyschiatric care for worsening depression  Some meds for depression were changed  She is feeling slightly better but still grieving about death of her   She is concerned about her son depression     Acid reflux is a bit better, diarrhea has improved  No chest pain or shortness of breath        The following portions of the patient's history were reviewed and updated as appropriate: current medications, past medical history, past social history and past surgical history  PHQ-9 Depression Screening    PHQ-9:    Frequency of the following problems over the past two weeks:                Current Outpatient Medications:     acetaminophen (TYLENOL) 500 mg tablet, 2 tablet po 3 times q day, Disp: , Rfl:     benztropine (COGENTIN) 0 5 mg tablet, Take 0 5 mg by mouth daily, Disp: , Rfl:     celecoxib (CeleBREX) 200 mg capsule, Take 200 mg by mouth daily at bedtime, Disp: , Rfl:     clonazePAM (KlonoPIN) 0 5 MG disintegrating tablet, Take 0 5 mg by mouth 2 (two) times a day, Disp: , Rfl:     desvenlafaxine (PRISTIQ) 100 mg 24 hr tablet, Take 100 mg by mouth daily  , Disp: , Rfl:     ergocalciferol (VITAMIN D2) 50,000 units, Take 1 capsule (50,000 Units total) by mouth once a week, Disp: 12 capsule, Rfl: 0    gabapentin (NEURONTIN) 300 mg capsule, 2 (two) times a day , Disp: , Rfl: 0    lurasidone (LATUDA) 20 mg tablet, Take 20 mg by mouth daily with breakfast, Disp: , Rfl:     Multiple Vitamins-Minerals (PRESERVISION AREDS 2 PO), Take 1 capsule by mouth daily, Disp: , Rfl:     Omega-3 Fatty Acids (FISH OIL PO), Take 1 capsule by mouth daily  , Disp: , Rfl:     omeprazole (PriLOSEC) 40 MG capsule, Take 1 capsule (40 mg total) by mouth daily, Disp: 90 capsule, Rfl: 1    OXcarbazepine (TRILEPTAL) 300 mg tablet, Take 300 mg by mouth 2 (two) times a day  , Disp: , Rfl:     oxybutynin (DITROPAN-XL) 10 MG 24 hr tablet, Take 10 mg by mouth daily, Disp: , Rfl: 3    oxyCODONE-acetaminophen (PERCOCET) 5-325 mg per tablet, Take 1 tablet by mouth every 4 (four) hours as needed for moderate pain, Disp: , Rfl:     propranolol (INDERAL) 20 mg tablet, Take 1 tablet (20 mg total) by mouth every 12 (twelve) hours, Disp: 180 tablet, Rfl: 1    Review of Systems   Constitutional: Positive for fatigue   Negative for fever and unexpected weight change  HENT: Negative for ear pain, hearing loss and sore throat  Eyes: Negative for pain and discharge  Respiratory: Negative for cough, chest tightness and shortness of breath  Cardiovascular: Negative for chest pain and palpitations  Gastrointestinal: Negative for abdominal pain, blood in stool, constipation, diarrhea and nausea  Genitourinary: Negative for dysuria, frequency and hematuria  Musculoskeletal: Negative for arthralgias and joint swelling  Skin: Negative for rash  Allergic/Immunologic: Negative for immunocompromised state  Neurological: Negative for dizziness and headaches  Hematological: Negative for adenopathy  Psychiatric/Behavioral: Positive for dysphoric mood  Negative for confusion and sleep disturbance  The patient is nervous/anxious  Objective:  /85 (BP Location: Left arm, Patient Position: Sitting, Cuff Size: Large)   Pulse 66   Temp 99 °F (37 2 °C)   Resp 15   Ht 5' 6" (1 676 m)   Wt 99 2 kg (218 lb 9 6 oz)   BMI 35 28 kg/m²      Physical Exam   Constitutional: She appears well-developed and well-nourished  HENT:   Head: Normocephalic and atraumatic  Right Ear: Tympanic membrane normal    Left Ear: Tympanic membrane normal    Nose: Nose normal    Mouth/Throat: Oropharynx is clear and moist  No posterior oropharyngeal edema or posterior oropharyngeal erythema  Eyes: Pupils are equal, round, and reactive to light  Conjunctivae are normal  Right eye exhibits no discharge  Left eye exhibits no discharge  Neck: Normal range of motion  Neck supple  No thyromegaly present  Cardiovascular: Normal rate, regular rhythm, S1 normal, S2 normal and normal heart sounds  PMI is not displaced  No murmur heard  Pulmonary/Chest: Effort normal and breath sounds normal  No accessory muscle usage  No apnea  No respiratory distress  She has no rhonchi  She has no rales  Abdominal: Soft   Normal appearance and bowel sounds are normal  She exhibits no shifting dullness  There is no hepatosplenomegaly  There is no tenderness  There is no rebound and no CVA tenderness  Musculoskeletal: Normal range of motion  She exhibits no edema or tenderness  Lymphadenopathy:     She has no cervical adenopathy  Neurological: She is alert  Skin: Skin is warm and intact  No rash noted  Psychiatric: She has a normal mood and affect  Her speech is normal    Nursing note and vitals reviewed  No results found for this or any previous visit (from the past 1008 hour(s))  ]    No results found

## 2020-01-27 ENCOUNTER — TELEPHONE (OUTPATIENT)
Dept: INTERNAL MEDICINE CLINIC | Facility: CLINIC | Age: 65
End: 2020-01-27

## 2020-01-27 DIAGNOSIS — R11.2 NAUSEA AND VOMITING, INTRACTABILITY OF VOMITING NOT SPECIFIED, UNSPECIFIED VOMITING TYPE: Primary | ICD-10-CM

## 2020-01-27 RX ORDER — ONDANSETRON 4 MG/1
4 TABLET, FILM COATED ORAL EVERY 8 HOURS PRN
Qty: 20 TABLET | Refills: 0 | Status: SHIPPED | OUTPATIENT
Start: 2020-01-27 | End: 2021-02-18 | Stop reason: SDUPTHER

## 2020-01-27 NOTE — TELEPHONE ENCOUNTER
Pt believes she has food poisoning since yesterday  She has been having diarrhea, nausea, and vomiting non stop since yesterday morning along with a headache  She is requesting something for her nausea is possible  Please advise?

## 2020-01-29 ENCOUNTER — OFFICE VISIT (OUTPATIENT)
Dept: INTERNAL MEDICINE CLINIC | Facility: CLINIC | Age: 65
End: 2020-01-29
Payer: MEDICARE

## 2020-01-29 VITALS
SYSTOLIC BLOOD PRESSURE: 120 MMHG | TEMPERATURE: 99.1 F | BODY MASS INDEX: 33.68 KG/M2 | DIASTOLIC BLOOD PRESSURE: 78 MMHG | HEIGHT: 66 IN | WEIGHT: 209.6 LBS | HEART RATE: 67 BPM | RESPIRATION RATE: 14 BRPM

## 2020-01-29 DIAGNOSIS — J02.9 VIRAL PHARYNGITIS: Primary | ICD-10-CM

## 2020-01-29 DIAGNOSIS — K21.9 GASTROESOPHAGEAL REFLUX DISEASE, ESOPHAGITIS PRESENCE NOT SPECIFIED: ICD-10-CM

## 2020-01-29 PROCEDURE — 99213 OFFICE O/P EST LOW 20 MIN: CPT | Performed by: INTERNAL MEDICINE

## 2020-01-29 RX ORDER — FLUTICASONE PROPIONATE 50 MCG
1 SPRAY, SUSPENSION (ML) NASAL EVERY 12 HOURS PRN
Qty: 1 BOTTLE | Refills: 1 | Status: SHIPPED | OUTPATIENT
Start: 2020-01-29 | End: 2020-01-29

## 2020-01-29 RX ORDER — CLONAZEPAM 1 MG/1
1 TABLET ORAL 2 TIMES DAILY
COMMUNITY
Start: 2020-01-09

## 2020-01-29 RX ORDER — FAMOTIDINE 20 MG/1
20 TABLET, FILM COATED ORAL 2 TIMES DAILY
Qty: 30 TABLET | Refills: 0 | Status: SHIPPED | OUTPATIENT
Start: 2020-01-29 | End: 2020-11-19

## 2020-01-30 NOTE — PROGRESS NOTES
Assessment/Plan:  1  Viral pharyngitis    2  Gastroesophageal reflux disease, esophagitis presence not specified  -     famotidine (PEPCID) 20 mg tablet; Take 1 tablet (20 mg total) by mouth 2 (two) times a day      Reassured that symptoms appear viral in nature  Has not tolerated flonase in past, advised to use cholarseptic, nasal saline  Pepcid for acid reflux  Call if symptoms dont improve by next week  Subjective:   Chief Complaint   Patient presents with    Sore Throat        Patient ID: Mio Knox is a 60 y o  female  She comes in for an acute appt  Since yesterday having sore throat  Had nausea, vomiting and diarrhea last week which seems to be improving but abdomen is a bit sore  Feeling some acid reflux  No fever but feels fatigued  The following portions of the patient's history were reviewed and updated as appropriate: current medications, past medical history, past social history and past surgical history      PHQ-9 Depression Screening    PHQ-9:    Frequency of the following problems over the past two weeks:                Current Outpatient Medications:     acetaminophen (TYLENOL) 500 mg tablet, 2 tablet po 3 times q day, Disp: , Rfl:     celecoxib (CeleBREX) 200 mg capsule, Take 200 mg by mouth daily at bedtime, Disp: , Rfl:     clonazePAM (KlonoPIN) 1 mg tablet, Take 1 mg by mouth 2 (two) times a day, Disp: , Rfl:     desvenlafaxine (PRISTIQ) 100 mg 24 hr tablet, Take 100 mg by mouth daily  , Disp: , Rfl:     ergocalciferol (VITAMIN D2) 50,000 units, Take 1 capsule (50,000 Units total) by mouth once a week, Disp: 12 capsule, Rfl: 0    gabapentin (NEURONTIN) 300 mg capsule, 2 (two) times a day , Disp: , Rfl: 0    lurasidone (LATUDA) 20 mg tablet, Take 20 mg by mouth daily with breakfast, Disp: , Rfl:     Multiple Vitamins-Minerals (PRESERVISION AREDS 2 PO), Take 1 capsule by mouth daily, Disp: , Rfl:     Omega-3 Fatty Acids (FISH OIL PO), Take 1 capsule by mouth daily  , Disp: , Rfl:     omeprazole (PriLOSEC) 40 MG capsule, Take 1 capsule (40 mg total) by mouth daily, Disp: 90 capsule, Rfl: 1    ondansetron (ZOFRAN) 4 mg tablet, Take 1 tablet (4 mg total) by mouth every 8 (eight) hours as needed for nausea or vomiting, Disp: 20 tablet, Rfl: 0    OXcarbazepine (TRILEPTAL) 300 mg tablet, Take 300 mg by mouth 2 (two) times a day  , Disp: , Rfl:     oxybutynin (DITROPAN-XL) 10 MG 24 hr tablet, Take 10 mg by mouth daily, Disp: , Rfl: 3    oxyCODONE-acetaminophen (PERCOCET) 5-325 mg per tablet, Take 1 tablet by mouth every 4 (four) hours as needed for moderate pain, Disp: , Rfl:     benztropine (COGENTIN) 0 5 mg tablet, Take 0 5 mg by mouth daily, Disp: , Rfl:     famotidine (PEPCID) 20 mg tablet, Take 1 tablet (20 mg total) by mouth 2 (two) times a day, Disp: 30 tablet, Rfl: 0    propranolol (INDERAL) 20 mg tablet, Take 1 tablet (20 mg total) by mouth every 12 (twelve) hours, Disp: 180 tablet, Rfl: 1    Review of Systems   Constitutional: Positive for fatigue  Negative for fever and unexpected weight change  HENT: Positive for congestion, postnasal drip and sore throat  Negative for ear pain and hearing loss  Eyes: Negative for pain and discharge  Respiratory: Negative for cough, chest tightness and shortness of breath  Cardiovascular: Negative for chest pain and palpitations  Gastrointestinal: Positive for abdominal pain, diarrhea and nausea  Negative for blood in stool and constipation  Genitourinary: Negative for dysuria, frequency and hematuria  Musculoskeletal: Negative for arthralgias and joint swelling  Skin: Negative for rash  Allergic/Immunologic: Negative for immunocompromised state  Neurological: Negative for dizziness and headaches  Hematological: Negative for adenopathy  Psychiatric/Behavioral: Negative for confusion and sleep disturbance           Objective:  /78 (BP Location: Left arm, Patient Position: Sitting, Cuff Size: Standard) Pulse 67   Temp 99 1 °F (37 3 °C)   Resp 14   Ht 5' 6" (1 676 m)   Wt 95 1 kg (209 lb 9 6 oz)   BMI 33 83 kg/m²      Physical Exam   Constitutional: She appears well-developed and well-nourished  HENT:   Head: Normocephalic and atraumatic  Right Ear: Tympanic membrane normal    Left Ear: Tympanic membrane normal    Nose: Nose normal    Mouth/Throat: Posterior oropharyngeal erythema present  No posterior oropharyngeal edema  Eyes: Pupils are equal, round, and reactive to light  Conjunctivae are normal  Right eye exhibits no discharge  Left eye exhibits no discharge  Neck: Normal range of motion  Neck supple  No thyromegaly present  Cardiovascular: Normal rate, regular rhythm, S1 normal, S2 normal and normal heart sounds  PMI is not displaced  No murmur heard  Pulmonary/Chest: Effort normal and breath sounds normal  No accessory muscle usage  No apnea  No respiratory distress  She has no rhonchi  She has no rales  Abdominal: Soft  Normal appearance and bowel sounds are normal  She exhibits no shifting dullness  There is no hepatosplenomegaly  There is tenderness in the epigastric area  There is no rebound and no CVA tenderness  Musculoskeletal: Normal range of motion  She exhibits no edema or tenderness  Lymphadenopathy:     She has no cervical adenopathy  Neurological: She is alert  Skin: Skin is warm and intact  No rash noted  Psychiatric: She has a normal mood and affect  Her speech is normal    Nursing note and vitals reviewed  No results found for this or any previous visit (from the past 1008 hour(s))  ]    No results found

## 2020-02-04 ENCOUNTER — TELEPHONE (OUTPATIENT)
Dept: INTERNAL MEDICINE CLINIC | Facility: CLINIC | Age: 65
End: 2020-02-04

## 2020-02-04 NOTE — TELEPHONE ENCOUNTER
Patient called to state she still had a severe sore throat and wanted an antibiotic  Per Dr Hidalgo patient was to continue the spray for the sore throat an to try some warm honey tea  Patient was called and given the instruction and was a little upset she could not get an antibiotic

## 2020-05-04 DIAGNOSIS — I10 ESSENTIAL HYPERTENSION: ICD-10-CM

## 2020-05-05 RX ORDER — PROPRANOLOL HYDROCHLORIDE 20 MG/1
20 TABLET ORAL EVERY 12 HOURS SCHEDULED
Qty: 180 TABLET | Refills: 2 | Status: SHIPPED | OUTPATIENT
Start: 2020-05-05 | End: 2020-09-09 | Stop reason: SDUPTHER

## 2020-09-08 DIAGNOSIS — I10 ESSENTIAL HYPERTENSION: ICD-10-CM

## 2020-09-09 ENCOUNTER — TELEPHONE (OUTPATIENT)
Dept: OTHER | Facility: OTHER | Age: 65
End: 2020-09-09

## 2020-09-09 ENCOUNTER — DOCUMENTATION (OUTPATIENT)
Dept: NON INVASIVE DIAGNOSTICS | Facility: HOSPITAL | Age: 65
End: 2020-09-09

## 2020-09-09 DIAGNOSIS — I10 ESSENTIAL HYPERTENSION: ICD-10-CM

## 2020-09-09 RX ORDER — PROPRANOLOL HYDROCHLORIDE 20 MG/1
20 TABLET ORAL EVERY 12 HOURS SCHEDULED
Qty: 20 TABLET | Refills: 0 | Status: SHIPPED | OUTPATIENT
Start: 2020-09-09 | End: 2020-09-10

## 2020-09-10 RX ORDER — PROPRANOLOL HYDROCHLORIDE 20 MG/1
20 TABLET ORAL EVERY 12 HOURS SCHEDULED
Qty: 60 TABLET | Refills: 0 | Status: SHIPPED | OUTPATIENT
Start: 2020-09-10 | End: 2020-10-05

## 2020-09-10 NOTE — TELEPHONE ENCOUNTER
[phone]/Jaja Altamirano/ 1955/Patient is requesting to speak with the on call for Dr Earnestine Whittington regarding a medication refill for propranolol/Patient stated she did call yesterday for a refill but that the pharmacy did not receive a script and she is to scared to go tonight without her medication and would like to have it filled for tonight

## 2020-09-10 NOTE — PROGRESS NOTES
Sent emergency 10 day supply of propranolol to [facility] as the patient is totally out of medication

## 2020-10-01 DIAGNOSIS — I10 ESSENTIAL HYPERTENSION: ICD-10-CM

## 2020-10-05 RX ORDER — PROPRANOLOL HYDROCHLORIDE 20 MG/1
20 TABLET ORAL EVERY 12 HOURS SCHEDULED
Qty: 180 TABLET | Refills: 1 | Status: SHIPPED | OUTPATIENT
Start: 2020-10-05 | End: 2020-10-14 | Stop reason: SDUPTHER

## 2020-10-13 DIAGNOSIS — I10 ESSENTIAL HYPERTENSION: ICD-10-CM

## 2020-10-14 RX ORDER — PROPRANOLOL HYDROCHLORIDE 20 MG/1
20 TABLET ORAL EVERY 12 HOURS SCHEDULED
Qty: 180 TABLET | Refills: 3 | Status: SHIPPED | OUTPATIENT
Start: 2020-10-14

## 2020-11-06 ENCOUNTER — LAB (OUTPATIENT)
Dept: LAB | Facility: HOSPITAL | Age: 65
End: 2020-11-06
Payer: MEDICARE

## 2020-11-06 LAB
25(OH)D3 SERPL-MCNC: 17.9 NG/ML (ref 30–100)
ALBUMIN SERPL BCP-MCNC: 3.8 G/DL (ref 3.5–5)
ALP SERPL-CCNC: 71 U/L (ref 46–116)
ALT SERPL W P-5'-P-CCNC: 29 U/L (ref 12–78)
ANION GAP SERPL CALCULATED.3IONS-SCNC: 9 MMOL/L (ref 4–13)
AST SERPL W P-5'-P-CCNC: 21 U/L (ref 5–45)
BASOPHILS # BLD AUTO: 0.02 THOUSANDS/ΜL (ref 0–0.1)
BASOPHILS NFR BLD AUTO: 0 % (ref 0–1)
BILIRUB SERPL-MCNC: 0.58 MG/DL (ref 0.2–1)
BUN SERPL-MCNC: 9 MG/DL (ref 5–25)
CALCIUM SERPL-MCNC: 9 MG/DL (ref 8.3–10.1)
CHLORIDE SERPL-SCNC: 105 MMOL/L (ref 100–108)
CHOLEST SERPL-MCNC: 178 MG/DL (ref 50–200)
CO2 SERPL-SCNC: 27 MMOL/L (ref 21–32)
CREAT SERPL-MCNC: 0.76 MG/DL (ref 0.6–1.3)
EOSINOPHIL # BLD AUTO: 0 THOUSAND/ΜL (ref 0–0.61)
EOSINOPHIL NFR BLD AUTO: 0 % (ref 0–6)
ERYTHROCYTE [DISTWIDTH] IN BLOOD BY AUTOMATED COUNT: 13.2 % (ref 11.6–15.1)
GFR SERPL CREATININE-BSD FRML MDRD: 84 ML/MIN/1.73SQ M
GLUCOSE P FAST SERPL-MCNC: 94 MG/DL (ref 65–99)
HCT VFR BLD AUTO: 41 % (ref 34.8–46.1)
HDLC SERPL-MCNC: 32 MG/DL
HGB BLD-MCNC: 13.4 G/DL (ref 11.5–15.4)
IMM GRANULOCYTES # BLD AUTO: 0.01 THOUSAND/UL (ref 0–0.2)
IMM GRANULOCYTES NFR BLD AUTO: 0 % (ref 0–2)
LDLC SERPL CALC-MCNC: 108 MG/DL (ref 0–100)
LYMPHOCYTES # BLD AUTO: 2.1 THOUSANDS/ΜL (ref 0.6–4.47)
LYMPHOCYTES NFR BLD AUTO: 37 % (ref 14–44)
MAGNESIUM SERPL-MCNC: 2 MG/DL (ref 1.6–2.6)
MCH RBC QN AUTO: 29.8 PG (ref 26.8–34.3)
MCHC RBC AUTO-ENTMCNC: 32.7 G/DL (ref 31.4–37.4)
MCV RBC AUTO: 91 FL (ref 82–98)
MONOCYTES # BLD AUTO: 0.38 THOUSAND/ΜL (ref 0.17–1.22)
MONOCYTES NFR BLD AUTO: 7 % (ref 4–12)
NEUTROPHILS # BLD AUTO: 3.12 THOUSANDS/ΜL (ref 1.85–7.62)
NEUTS SEG NFR BLD AUTO: 56 % (ref 43–75)
NRBC BLD AUTO-RTO: 0 /100 WBCS
PLATELET # BLD AUTO: 176 THOUSANDS/UL (ref 149–390)
PMV BLD AUTO: 9.5 FL (ref 8.9–12.7)
POTASSIUM SERPL-SCNC: 4.1 MMOL/L (ref 3.5–5.3)
PROT SERPL-MCNC: 7.5 G/DL (ref 6.4–8.2)
RBC # BLD AUTO: 4.49 MILLION/UL (ref 3.81–5.12)
SODIUM SERPL-SCNC: 141 MMOL/L (ref 136–145)
TRIGL SERPL-MCNC: 192 MG/DL
TSH SERPL DL<=0.05 MIU/L-ACNC: 1.73 UIU/ML (ref 0.36–3.74)
WBC # BLD AUTO: 5.63 THOUSAND/UL (ref 4.31–10.16)

## 2020-11-06 PROCEDURE — 85025 COMPLETE CBC W/AUTO DIFF WBC: CPT | Performed by: INTERNAL MEDICINE

## 2020-11-06 PROCEDURE — 80053 COMPREHEN METABOLIC PANEL: CPT | Performed by: INTERNAL MEDICINE

## 2020-11-06 PROCEDURE — 83735 ASSAY OF MAGNESIUM: CPT | Performed by: INTERNAL MEDICINE

## 2020-11-06 PROCEDURE — 80061 LIPID PANEL: CPT | Performed by: INTERNAL MEDICINE

## 2020-11-06 PROCEDURE — 84443 ASSAY THYROID STIM HORMONE: CPT | Performed by: INTERNAL MEDICINE

## 2020-11-06 PROCEDURE — 82306 VITAMIN D 25 HYDROXY: CPT | Performed by: INTERNAL MEDICINE

## 2020-11-06 PROCEDURE — 36415 COLL VENOUS BLD VENIPUNCTURE: CPT | Performed by: INTERNAL MEDICINE

## 2020-11-13 ENCOUNTER — OFFICE VISIT (OUTPATIENT)
Dept: INTERNAL MEDICINE CLINIC | Facility: CLINIC | Age: 65
End: 2020-11-13
Payer: MEDICARE

## 2020-11-13 VITALS
WEIGHT: 213.6 LBS | DIASTOLIC BLOOD PRESSURE: 86 MMHG | OXYGEN SATURATION: 98 % | BODY MASS INDEX: 34.33 KG/M2 | TEMPERATURE: 98.8 F | HEART RATE: 57 BPM | HEIGHT: 66 IN | SYSTOLIC BLOOD PRESSURE: 142 MMHG

## 2020-11-13 DIAGNOSIS — Z23 FLU VACCINE NEED: Primary | ICD-10-CM

## 2020-11-13 DIAGNOSIS — N39.46 MIXED STRESS AND URGE URINARY INCONTINENCE: ICD-10-CM

## 2020-11-13 DIAGNOSIS — E78.5 HYPERLIPIDEMIA, UNSPECIFIED HYPERLIPIDEMIA TYPE: ICD-10-CM

## 2020-11-13 DIAGNOSIS — F31.81 BIPOLAR 2 DISORDER (HCC): ICD-10-CM

## 2020-11-13 DIAGNOSIS — K21.9 GASTROESOPHAGEAL REFLUX DISEASE, UNSPECIFIED WHETHER ESOPHAGITIS PRESENT: ICD-10-CM

## 2020-11-13 DIAGNOSIS — E55.9 VITAMIN D DEFICIENCY: ICD-10-CM

## 2020-11-13 DIAGNOSIS — R30.0 DYSURIA: ICD-10-CM

## 2020-11-13 DIAGNOSIS — I51.7 LEFT VENTRICULAR HYPERTROPHY: ICD-10-CM

## 2020-11-13 DIAGNOSIS — Z87.891 PERSONAL HISTORY OF NICOTINE DEPENDENCE: ICD-10-CM

## 2020-11-13 DIAGNOSIS — Z00.00 MEDICARE ANNUAL WELLNESS VISIT, SUBSEQUENT: ICD-10-CM

## 2020-11-13 DIAGNOSIS — Z12.39 ENCOUNTER FOR SCREENING FOR MALIGNANT NEOPLASM OF BREAST, UNSPECIFIED SCREENING MODALITY: ICD-10-CM

## 2020-11-13 DIAGNOSIS — F17.200 SMOKING: ICD-10-CM

## 2020-11-13 DIAGNOSIS — Z12.31 VISIT FOR SCREENING MAMMOGRAM: ICD-10-CM

## 2020-11-13 DIAGNOSIS — N30.90 CYSTITIS: ICD-10-CM

## 2020-11-13 LAB
SL AMB  POCT GLUCOSE, UA: NEGATIVE
SL AMB LEUKOCYTE ESTERASE,UA: ABNORMAL
SL AMB POCT BILIRUBIN,UA: NEGATIVE
SL AMB POCT BLOOD,UA: NEGATIVE
SL AMB POCT CLARITY,UA: CLEAR
SL AMB POCT COLOR,UA: YELLOW
SL AMB POCT KETONES,UA: NEGATIVE
SL AMB POCT NITRITE,UA: NEGATIVE
SL AMB POCT PH,UA: 0.2
SL AMB POCT SPECIFIC GRAVITY,UA: 1.01
SL AMB POCT URINE PROTEIN: NEGATIVE
SL AMB POCT UROBILINOGEN: 0.2

## 2020-11-13 PROCEDURE — 81002 URINALYSIS NONAUTO W/O SCOPE: CPT | Performed by: INTERNAL MEDICINE

## 2020-11-13 PROCEDURE — G0008 ADMIN INFLUENZA VIRUS VAC: HCPCS | Performed by: INTERNAL MEDICINE

## 2020-11-13 PROCEDURE — 99214 OFFICE O/P EST MOD 30 MIN: CPT | Performed by: INTERNAL MEDICINE

## 2020-11-13 PROCEDURE — G0438 PPPS, INITIAL VISIT: HCPCS | Performed by: INTERNAL MEDICINE

## 2020-11-13 PROCEDURE — 90682 RIV4 VACC RECOMBINANT DNA IM: CPT | Performed by: INTERNAL MEDICINE

## 2020-11-13 RX ORDER — SULFAMETHOXAZOLE AND TRIMETHOPRIM 800; 160 MG/1; MG/1
1 TABLET ORAL EVERY 12 HOURS SCHEDULED
Qty: 6 TABLET | Refills: 0 | Status: SHIPPED | OUTPATIENT
Start: 2020-11-13 | End: 2020-11-16

## 2020-11-13 RX ORDER — ERGOCALCIFEROL 1.25 MG/1
50000 CAPSULE ORAL WEEKLY
Qty: 12 CAPSULE | Refills: 0 | Status: SHIPPED | OUTPATIENT
Start: 2020-11-13

## 2020-11-13 RX ORDER — FLUCONAZOLE 150 MG/1
150 TABLET ORAL ONCE
Qty: 1 TABLET | Refills: 0 | Status: SHIPPED | OUTPATIENT
Start: 2020-11-13 | End: 2020-11-13

## 2020-11-19 ENCOUNTER — OFFICE VISIT (OUTPATIENT)
Dept: CARDIOLOGY CLINIC | Facility: CLINIC | Age: 65
End: 2020-11-19

## 2020-11-19 ENCOUNTER — HOSPITAL ENCOUNTER (OUTPATIENT)
Dept: CT IMAGING | Facility: HOSPITAL | Age: 65
Discharge: HOME | End: 2020-11-19
Payer: MEDICARE

## 2020-11-19 VITALS
HEIGHT: 66 IN | SYSTOLIC BLOOD PRESSURE: 110 MMHG | HEART RATE: 65 BPM | WEIGHT: 213.4 LBS | DIASTOLIC BLOOD PRESSURE: 60 MMHG | BODY MASS INDEX: 34.3 KG/M2 | TEMPERATURE: 97.9 F

## 2020-11-19 DIAGNOSIS — R00.2 PALPITATIONS: Primary | ICD-10-CM

## 2020-11-19 DIAGNOSIS — E78.2 MIXED HYPERLIPIDEMIA: ICD-10-CM

## 2020-11-19 DIAGNOSIS — I34.1 MITRAL VALVE PROLAPSE: ICD-10-CM

## 2020-11-19 DIAGNOSIS — K75.81 STEATOHEPATITIS: ICD-10-CM

## 2020-11-19 DIAGNOSIS — F17.200 SMOKING: ICD-10-CM

## 2020-11-19 DIAGNOSIS — I51.7 LEFT VENTRICULAR HYPERTROPHY: ICD-10-CM

## 2020-11-19 DIAGNOSIS — Z87.891 PERSONAL HISTORY OF NICOTINE DEPENDENCE: ICD-10-CM

## 2020-11-19 PROCEDURE — G1004 CDSM NDSC: HCPCS

## 2020-11-19 PROCEDURE — G0297 LDCT FOR LUNG CA SCREEN: HCPCS

## 2020-11-19 PROCEDURE — 99214 OFFICE O/P EST MOD 30 MIN: CPT | Performed by: INTERNAL MEDICINE

## 2020-11-19 PROCEDURE — 93000 ELECTROCARDIOGRAM COMPLETE: CPT | Performed by: INTERNAL MEDICINE

## 2020-11-28 ENCOUNTER — HOSPITAL ENCOUNTER (EMERGENCY)
Facility: HOSPITAL | Age: 65
Discharge: HOME | End: 2020-11-28
Attending: EMERGENCY MEDICINE | Admitting: EMERGENCY MEDICINE
Payer: MEDICARE

## 2020-11-28 VITALS
BODY MASS INDEX: 34.52 KG/M2 | SYSTOLIC BLOOD PRESSURE: 144 MMHG | WEIGHT: 213.85 LBS | HEART RATE: 60 BPM | DIASTOLIC BLOOD PRESSURE: 81 MMHG | TEMPERATURE: 96.9 F | RESPIRATION RATE: 16 BRPM | OXYGEN SATURATION: 98 %

## 2020-11-28 DIAGNOSIS — R30.0 DYSURIA: Primary | ICD-10-CM

## 2020-11-28 LAB
BACTERIA UR QL AUTO: ABNORMAL /HPF
BILIRUB UR QL STRIP: NEGATIVE
CLARITY UR: CLEAR
COLOR UR: ABNORMAL
COLOR, POC: NORMAL
GLUCOSE UR STRIP-MCNC: ABNORMAL MG/DL
HGB UR QL STRIP.AUTO: NEGATIVE
KETONES UR STRIP-MCNC: NEGATIVE MG/DL
LEUKOCYTE ESTERASE UR QL STRIP: NEGATIVE
NITRITE UR QL STRIP: POSITIVE
NON-SQ EPI CELLS URNS QL MICRO: ABNORMAL /HPF
PH UR STRIP.AUTO: 6 [PH] (ref 4.5–8)
PROT UR STRIP-MCNC: NEGATIVE MG/DL
RBC #/AREA URNS AUTO: ABNORMAL /HPF
SP GR UR STRIP.AUTO: 1.01 (ref 1–1.03)
UROBILINOGEN UR QL STRIP.AUTO: 1 E.U./DL
WBC #/AREA URNS AUTO: ABNORMAL /HPF

## 2020-11-28 PROCEDURE — 81001 URINALYSIS AUTO W/SCOPE: CPT

## 2020-11-28 PROCEDURE — 99283 EMERGENCY DEPT VISIT LOW MDM: CPT

## 2020-11-28 PROCEDURE — 99284 EMERGENCY DEPT VISIT MOD MDM: CPT | Performed by: EMERGENCY MEDICINE

## 2020-11-28 RX ORDER — CEPHALEXIN 500 MG/1
500 CAPSULE ORAL EVERY 8 HOURS SCHEDULED
Qty: 15 CAPSULE | Refills: 0 | Status: SHIPPED | OUTPATIENT
Start: 2020-11-28 | End: 2020-12-02 | Stop reason: SDUPTHER

## 2020-12-02 ENCOUNTER — OFFICE VISIT (OUTPATIENT)
Dept: INTERNAL MEDICINE CLINIC | Facility: CLINIC | Age: 65
End: 2020-12-02
Payer: MEDICARE

## 2020-12-02 VITALS
TEMPERATURE: 97.7 F | SYSTOLIC BLOOD PRESSURE: 146 MMHG | BODY MASS INDEX: 33.68 KG/M2 | DIASTOLIC BLOOD PRESSURE: 86 MMHG | WEIGHT: 209.6 LBS | HEART RATE: 86 BPM | RESPIRATION RATE: 13 BRPM | HEIGHT: 66 IN
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
## 2020-12-02 DIAGNOSIS — R32 URINARY INCONTINENCE, UNSPECIFIED TYPE: ICD-10-CM

## 2020-12-02 PROCEDURE — 99213 OFFICE O/P EST LOW 20 MIN: CPT | Performed by: INTERNAL MEDICINE

## 2020-12-02 RX ORDER — TOLTERODINE 4 MG/1
4 CAPSULE, EXTENDED RELEASE ORAL DAILY
COMMUNITY

## 2020-12-02 RX ORDER — CEPHALEXIN 500 MG/1
500 CAPSULE ORAL EVERY 8 HOURS SCHEDULED
Qty: 21 CAPSULE | Refills: 0 | Status: SHIPPED | OUTPATIENT
Start: 2020-12-02 | End: 2020-12-09

## 2021-02-15 ENCOUNTER — HOSPITAL ENCOUNTER (OUTPATIENT)
Dept: MAMMOGRAPHY | Facility: CLINIC | Age: 66
Discharge: HOME | End: 2021-02-15
Payer: MEDICARE

## 2021-02-15 VITALS — WEIGHT: 209 LBS | HEIGHT: 66 IN | BODY MASS INDEX: 33.59 KG/M2

## 2021-02-15 DIAGNOSIS — Z12.31 VISIT FOR SCREENING MAMMOGRAM: ICD-10-CM

## 2021-02-15 PROCEDURE — 77067 SCR MAMMO BI INCL CAD: CPT

## 2021-02-15 PROCEDURE — 77063 BREAST TOMOSYNTHESIS BI: CPT

## 2021-02-18 ENCOUNTER — TELEPHONE (OUTPATIENT)
Dept: INTERNAL MEDICINE CLINIC | Facility: CLINIC | Age: 66
End: 2021-02-18

## 2021-02-18 DIAGNOSIS — B34.9 VIRAL INFECTION, UNSPECIFIED: Primary | ICD-10-CM

## 2021-02-18 DIAGNOSIS — R11.2 NAUSEA AND VOMITING, INTRACTABILITY OF VOMITING NOT SPECIFIED, UNSPECIFIED VOMITING TYPE: ICD-10-CM

## 2021-02-18 RX ORDER — ONDANSETRON 4 MG/1
4 TABLET, FILM COATED ORAL EVERY 8 HOURS PRN
Qty: 20 TABLET | Refills: 0 | Status: SHIPPED | OUTPATIENT
Start: 2021-02-18

## 2021-02-18 NOTE — TELEPHONE ENCOUNTER
I will put an order for covid testing when she can go, stay home, make sure she is drinking ample fluids, I will send a nausea medication to her pharmacy, stay isolated and if symptoms get worse, go to ER, Also u can put her in for a virtual tomorrow so I can check up on her

## 2021-02-18 NOTE — TELEPHONE ENCOUNTER
Patient has a fever and no appetite and is feeling very nauseated she has concerns about having COVID  She is not able to go out today because of the weather  She would like to know what she can do

## 2021-02-19 ENCOUNTER — TELEMEDICINE (OUTPATIENT)
Dept: INTERNAL MEDICINE CLINIC | Facility: CLINIC | Age: 66
End: 2021-02-19
Payer: MEDICARE

## 2021-02-19 VITALS — BODY MASS INDEX: 33.09 KG/M2 | WEIGHT: 205 LBS

## 2021-02-19 DIAGNOSIS — B34.9 VIRAL INFECTION, UNSPECIFIED: ICD-10-CM

## 2021-02-19 DIAGNOSIS — A08.4 VIRAL DIARRHEA: Primary | ICD-10-CM

## 2021-02-19 LAB — SARS-COV-2 RNA RESP QL NAA+PROBE: NEGATIVE

## 2021-02-19 PROCEDURE — U0003 INFECTIOUS AGENT DETECTION BY NUCLEIC ACID (DNA OR RNA); SEVERE ACUTE RESPIRATORY SYNDROME CORONAVIRUS 2 (SARS-COV-2) (CORONAVIRUS DISEASE [COVID-19]), AMPLIFIED PROBE TECHNIQUE, MAKING USE OF HIGH THROUGHPUT TECHNOLOGIES AS DESCRIBED BY CMS-2020-01-R: HCPCS | Performed by: INTERNAL MEDICINE

## 2021-02-19 PROCEDURE — 99214 OFFICE O/P EST MOD 30 MIN: CPT | Performed by: INTERNAL MEDICINE

## 2021-02-19 PROCEDURE — U0005 INFEC AGEN DETEC AMPLI PROBE: HCPCS | Performed by: INTERNAL MEDICINE

## 2021-02-19 NOTE — PROGRESS NOTES
COVID-19 Virtual Visit     Assessment/Plan:    Problem List Items Addressed This Visit     None      Visit Diagnoses     Viral diarrhea    -  Primary         Disposition:     I referred patient to one of our centralized sites for a COVID-19 swab  Advised ample fluids, rest, Tylenol, continue Zofran as needed  Go to the ER if symptoms get worse  Call if she needs anything  Continue isolation at this point until we find out more about her symptoms  I have spent 15 minutes directly with the patient  Greater than 50% of this time was spent in counseling/coordination of care regarding: instructions for management and patient and family education  Encounter provider Gallito Smith MD    Provider located at [facility] [id number]    Recent Visits  Date Type Provider Dept   02/18/21 Telephone Gallito Smith MD [facility] [address] recent visits within past 7 days and meeting all other requirements     Today's Visits  Date Type Provider Dept   02/19/21 Telemedicine Gallito Smith MD [facility] [place]   Showing today's visits and meeting all other requirements     Future Appointments  No visits were found meeting these conditions  Showing future appointments within next 150 days and meeting all other requirements      This virtual check-in was done via [employer] and patient was informed that this is not a secure, HIPAA-compliant platform  She agrees to proceed  Patient agrees to participate in a virtual check in via telephone or video visit instead of presenting to the office to address urgent/immediate medical needs  Patient is aware this is a billable service  After connecting through [facility], the patient was identified by name and date of birth  Janieumm Lewis was informed that this was a telemedicine visit and that the exam was being conducted confidentially over secure lines   My office door was closed  No one else was in the room  Darius Baeza acknowledged consent and understanding of privacy and security of the telemedicine visit  I informed the patient that I have reviewed her record in Epic and presented the opportunity for her to ask any questions regarding the visit today  The patient agreed to participate  Subjective:   Darius Baeza is a 63 y o  female who is concerned about COVID-19  Patient's symptoms include fever, fatigue, nausea, diarrhea and headache  Patient denies chills, malaise, congestion, rhinorrhea, sore throat, anosmia, loss of taste, cough, shortness of breath, chest tightness, abdominal pain, vomiting and myalgias       Date of symptom onset: 2/16/2021    Exposure:   Contact with a person who is under investigation (PUI) for or who is positive for COVID-19 within the last 14 days?: Yes    Hospitalized recently for fever and/or lower respiratory symptoms?: No      Currently a healthcare worker that is involved in direct patient care?: No      Works in a special setting where the risk of COVID-19 transmission may be high? (this may include long-term care, correctional and alf facilities; homeless shelters; assisted-living facilities and group homes ): No      Resident in a special setting where the risk of COVID-19 transmission may be high? (this may include long-term care, correctional and alf facilities; homeless shelters; assisted-living facilities and group homes ): No      No results found for: Gisellchauncey Nati, [address] [facility], [address] [place] [address],[address], [place]  Past Medical History:   Diagnosis Date    Anxiety     Atrial premature beats     Bradycardia     Cardiomegaly     Depression     Hypertension     Nonrheumatic tricuspid valve regurgitation     Pure hypercholesterolemia     Splenomegaly     Tricuspid valve stenosis, nonrheumatic     Vitamin D deficiency      Past Surgical History:   Procedure Laterality Date    HYSTERECTOMY      age 54   Ronald Perkins TIBIA FRACTURE SURGERY Left      Current Outpatient Medications   Medication Sig Dispense Refill    acetaminophen (TYLENOL) 500 mg tablet 2 tablet po 3 times q day      clonazePAM (KlonoPIN) 1 mg tablet Take 1 mg by mouth 2 (two) times a day      desvenlafaxine (PRISTIQ) 100 mg 24 hr tablet Take 100 mg by mouth daily        ergocalciferol (VITAMIN D2) 50,000 units Take 1 capsule (50,000 Units total) by mouth once a week 12 capsule 0    gabapentin (NEURONTIN) 300 mg capsule 2 (two) times a day   0    lurasidone (LATUDA) 20 mg tablet Take 20 mg by mouth daily with dinner       Multiple Vitamins-Minerals (PRESERVISION AREDS 2 PO) Take 1 capsule by mouth daily      omeprazole (PriLOSEC) 40 MG capsule Take 1 capsule (40 mg total) by mouth daily 90 capsule 1    ondansetron (ZOFRAN) 4 mg tablet Take 1 tablet (4 mg total) by mouth every 8 (eight) hours as needed for nausea or vomiting 20 tablet 0    OXcarbazepine (TRILEPTAL) 300 mg tablet Take 300 mg by mouth 2 (two) times a day        propranolol (INDERAL) 20 mg tablet Take 1 tablet (20 mg total) by mouth every 12 (twelve) hours 180 tablet 3    tolterodine (DETROL LA) 4 mg 24 hr capsule Take 4 mg by mouth daily      benztropine (COGENTIN) 0 5 mg tablet Take 0 5 mg by mouth daily      celecoxib (CeleBREX) 200 mg capsule Take 200 mg by mouth daily at bedtime      Omega-3 Fatty Acids (FISH OIL PO) Take 1 capsule by mouth daily        oxyCODONE-acetaminophen (PERCOCET) 5-325 mg per tablet Take 1 tablet by mouth every 4 (four) hours as needed for moderate pain       No current facility-administered medications for this visit  Allergies   Allergen Reactions    Bupropion     Corticosteroids     Erythromycin     Escitalopram     Tetracycline      Other reaction(s): Other (See Comments)  Mouth sores    Other Palpitations     "Steroids"        Review of Systems   Constitutional: Positive for fatigue and fever  Negative for chills     HENT: Negative for congestion, rhinorrhea and sore throat  Respiratory: Negative for cough, chest tightness and shortness of breath  Gastrointestinal: Positive for diarrhea and nausea  Negative for abdominal pain and vomiting  Musculoskeletal: Negative for myalgias  Neurological: Positive for headaches  Objective:    Vitals:    02/19/21 0847   Weight: 93 kg (205 lb)       Physical Exam  Vitals signs and nursing note reviewed  Constitutional:       General: She is not in acute distress  Appearance: She is ill-appearing  She is not toxic-appearing  Comments: Appears tired   HENT:      Nose: Nose normal    Pulmonary:      Effort: Pulmonary effort is normal  No respiratory distress  Neurological:      Mental Status: She is alert  Psychiatric:         Mood and Affect: Mood normal        VIRTUAL VISIT DISCLAIMER    Bonita Frazier acknowledges that she has consented to an online visit or consultation  She understands that the online visit is based solely on information provided by her, and that, in the absence of a face-to-face physical evaluation by the physician, the diagnosis she receives is both limited and provisional in terms of accuracy and completeness  This is not intended to replace a full medical face-to-face evaluation by the physician  Bonita Frazier understands and accepts these terms

## 2021-02-22 ENCOUNTER — TELEPHONE (OUTPATIENT)
Dept: INTERNAL MEDICINE CLINIC | Facility: CLINIC | Age: 66
End: 2021-02-22

## 2021-02-22 NOTE — TELEPHONE ENCOUNTER
----- Message from Sergio Walton MD sent at 2/22/2021  8:43 AM EST -----  Please let patient know that test did not have any major abnormalitites  Is she feeling better?

## 2021-03-30 DIAGNOSIS — Z23 ENCOUNTER FOR IMMUNIZATION: ICD-10-CM

## 2021-04-05 ENCOUNTER — IMMUNIZATIONS (OUTPATIENT)
Dept: FAMILY MEDICINE CLINIC | Facility: HOSPITAL | Age: 66
End: 2021-04-05

## 2021-04-05 DIAGNOSIS — Z23 ENCOUNTER FOR IMMUNIZATION: Primary | ICD-10-CM

## 2021-04-05 PROCEDURE — 91301 SARS-COV-2 / COVID-19 MRNA VACCINE (MODERNA) 100 MCG: CPT

## 2021-04-05 PROCEDURE — 0011A SARS-COV-2 / COVID-19 MRNA VACCINE (MODERNA) 100 MCG: CPT

## 2021-05-05 ENCOUNTER — IMMUNIZATIONS (OUTPATIENT)
Dept: FAMILY MEDICINE CLINIC | Facility: HOSPITAL | Age: 66
End: 2021-05-05

## 2021-05-05 DIAGNOSIS — Z23 ENCOUNTER FOR IMMUNIZATION: Primary | ICD-10-CM

## 2021-05-05 PROCEDURE — 0012A SARS-COV-2 / COVID-19 MRNA VACCINE (MODERNA) 100 MCG: CPT

## 2021-05-05 PROCEDURE — 91301 SARS-COV-2 / COVID-19 MRNA VACCINE (MODERNA) 100 MCG: CPT

## 2022-01-13 ENCOUNTER — TELEPHONE (OUTPATIENT)
Dept: CARDIOLOGY CLINIC | Facility: CLINIC | Age: 67
End: 2022-01-13

## 2022-01-13 NOTE — TELEPHONE ENCOUNTER
Called stating requested records to be sent to [facility] where she now moved /not yet received    Provided her with information [facility] since request was forwarded to them on 1/4/22